# Patient Record
Sex: FEMALE | Race: WHITE | NOT HISPANIC OR LATINO | Employment: FULL TIME | URBAN - METROPOLITAN AREA
[De-identification: names, ages, dates, MRNs, and addresses within clinical notes are randomized per-mention and may not be internally consistent; named-entity substitution may affect disease eponyms.]

---

## 2017-06-08 ENCOUNTER — HISTORICAL (OUTPATIENT)
Dept: LAB | Facility: HOSPITAL | Age: 42
End: 2017-06-08

## 2017-06-08 LAB
ABS NEUT (OLG): 5.95 X10(3)/MCL (ref 2.1–9.2)
ALBUMIN SERPL-MCNC: 3.7 GM/DL (ref 3.4–5)
ALBUMIN/GLOB SERPL: 1 RATIO (ref 1.1–2)
ALP SERPL-CCNC: 98 UNIT/L (ref 38–126)
ALT SERPL-CCNC: 21 UNIT/L (ref 12–78)
AST SERPL-CCNC: 10 UNIT/L (ref 15–37)
BASOPHILS # BLD AUTO: 0 X10(3)/MCL (ref 0–0.2)
BASOPHILS NFR BLD AUTO: 0 %
BILIRUB SERPL-MCNC: 0.3 MG/DL (ref 0.2–1)
BILIRUBIN DIRECT+TOT PNL SERPL-MCNC: 0.1 MG/DL (ref 0–0.5)
BILIRUBIN DIRECT+TOT PNL SERPL-MCNC: 0.2 MG/DL (ref 0–0.8)
BUN SERPL-MCNC: 6 MG/DL (ref 7–18)
CALCIUM SERPL-MCNC: 9.8 MG/DL (ref 8.5–10.1)
CHLORIDE SERPL-SCNC: 105 MMOL/L (ref 98–107)
CHOLEST SERPL-MCNC: 230 MG/DL (ref 0–200)
CHOLEST/HDLC SERPL: 5.2 {RATIO} (ref 0–4)
CO2 SERPL-SCNC: 25 MMOL/L (ref 21–32)
CREAT SERPL-MCNC: 0.68 MG/DL (ref 0.55–1.02)
DEPRECATED CALCIDIOL+CALCIFEROL SERPL-MC: 30.3 NG/ML (ref 30–80)
EOSINOPHIL # BLD AUTO: 0.3 X10(3)/MCL (ref 0–0.9)
EOSINOPHIL NFR BLD AUTO: 3 %
ERYTHROCYTE [DISTWIDTH] IN BLOOD BY AUTOMATED COUNT: 12.2 % (ref 11.5–17)
EST. AVERAGE GLUCOSE BLD GHB EST-MCNC: 180 MG/DL
GLOBULIN SER-MCNC: 3.8 GM/DL (ref 2.4–3.5)
GLUCOSE SERPL-MCNC: 224 MG/DL (ref 74–106)
HBA1C MFR BLD: 7.9 % (ref 4.2–6.3)
HCT VFR BLD AUTO: 44.7 % (ref 37–47)
HDLC SERPL-MCNC: 44 MG/DL (ref 35–60)
HGB BLD-MCNC: 15.3 GM/DL (ref 12–16)
LDLC SERPL CALC-MCNC: 129 MG/DL (ref 0–129)
LYMPHOCYTES # BLD AUTO: 3.4 X10(3)/MCL (ref 0.6–4.6)
LYMPHOCYTES NFR BLD AUTO: 33 %
MCH RBC QN AUTO: 30 PG (ref 27–31)
MCHC RBC AUTO-ENTMCNC: 34.2 GM/DL (ref 33–36)
MCV RBC AUTO: 87.6 FL (ref 80–94)
MONOCYTES # BLD AUTO: 0.5 X10(3)/MCL (ref 0.1–1.3)
MONOCYTES NFR BLD AUTO: 5 %
NEUTROPHILS # BLD AUTO: 5.95 X10(3)/MCL (ref 2.1–9.2)
NEUTROPHILS NFR BLD AUTO: 58 %
PLATELET # BLD AUTO: 393 X10(3)/MCL (ref 130–400)
PMV BLD AUTO: 10.4 FL (ref 9.4–12.4)
POTASSIUM SERPL-SCNC: 4.6 MMOL/L (ref 3.5–5.1)
PROT SERPL-MCNC: 7.5 GM/DL (ref 6.4–8.2)
RBC # BLD AUTO: 5.1 X10(6)/MCL (ref 4.2–5.4)
SODIUM SERPL-SCNC: 141 MMOL/L (ref 136–145)
TRIGL SERPL-MCNC: 283 MG/DL (ref 30–150)
TSH SERPL-ACNC: 0.15 MIU/ML (ref 0.36–3.74)
VLDLC SERPL CALC-MCNC: 57 MG/DL
WBC # SPEC AUTO: 10.2 X10(3)/MCL (ref 4.5–11.5)

## 2017-09-25 ENCOUNTER — HISTORICAL (OUTPATIENT)
Dept: LAB | Facility: HOSPITAL | Age: 42
End: 2017-09-25

## 2017-09-25 LAB
ABS NEUT (OLG): 6.72 X10(3)/MCL (ref 2.1–9.2)
BASOPHILS # BLD AUTO: 0.1 X10(3)/MCL (ref 0–0.2)
BASOPHILS NFR BLD AUTO: 1 %
CHOLEST SERPL-MCNC: 183 MG/DL (ref 0–200)
CHOLEST/HDLC SERPL: 5.9 {RATIO} (ref 0–4)
EOSINOPHIL # BLD AUTO: 0.4 X10(3)/MCL (ref 0–0.9)
EOSINOPHIL NFR BLD AUTO: 4 %
ERYTHROCYTE [DISTWIDTH] IN BLOOD BY AUTOMATED COUNT: 12.4 % (ref 11.5–17)
EST. AVERAGE GLUCOSE BLD GHB EST-MCNC: 174 MG/DL
HBA1C MFR BLD: 7.7 % (ref 4.2–6.3)
HCT VFR BLD AUTO: 40.9 % (ref 37–47)
HDLC SERPL-MCNC: 31 MG/DL (ref 35–60)
HGB BLD-MCNC: 14.2 GM/DL (ref 12–16)
LDLC SERPL CALC-MCNC: 26 MG/DL (ref 0–129)
LYMPHOCYTES # BLD AUTO: 3.3 X10(3)/MCL (ref 0.6–4.6)
LYMPHOCYTES NFR BLD AUTO: 29 %
MCH RBC QN AUTO: 31 PG (ref 27–31)
MCHC RBC AUTO-ENTMCNC: 34.7 GM/DL (ref 33–36)
MCV RBC AUTO: 89.3 FL (ref 80–94)
MONOCYTES # BLD AUTO: 0.7 X10(3)/MCL (ref 0.1–1.3)
MONOCYTES NFR BLD AUTO: 6 %
NEUTROPHILS # BLD AUTO: 6.72 X10(3)/MCL (ref 1.4–7.9)
NEUTROPHILS NFR BLD AUTO: 60 %
PLATELET # BLD AUTO: 408 X10(3)/MCL (ref 130–400)
PMV BLD AUTO: 10.7 FL (ref 9.4–12.4)
RBC # BLD AUTO: 4.58 X10(6)/MCL (ref 4.2–5.4)
TRIGL SERPL-MCNC: 632 MG/DL (ref 30–150)
TSH SERPL-ACNC: 0.35 MIU/ML (ref 0.36–3.74)
VLDLC SERPL CALC-MCNC: 126 MG/DL
WBC # SPEC AUTO: 11.3 X10(3)/MCL (ref 4.5–11.5)

## 2017-09-27 ENCOUNTER — HISTORICAL (OUTPATIENT)
Dept: RADIOLOGY | Facility: HOSPITAL | Age: 42
End: 2017-09-27

## 2017-09-28 LAB
HUMAN PAPILLOMAVIRUS (HPV): NORMAL
PAP RECOMMENDATION EXT: NORMAL
PAP SMEAR: NORMAL

## 2017-11-30 ENCOUNTER — HISTORICAL (OUTPATIENT)
Dept: LAB | Facility: HOSPITAL | Age: 42
End: 2017-11-30

## 2017-11-30 LAB — TSH SERPL-ACNC: 0.41 MIU/ML (ref 0.36–3.74)

## 2018-03-13 ENCOUNTER — HISTORICAL (OUTPATIENT)
Dept: LAB | Facility: HOSPITAL | Age: 43
End: 2018-03-13

## 2018-03-13 LAB
ABS NEUT (OLG): 7.17 X10(3)/MCL (ref 2.1–9.2)
ALBUMIN SERPL-MCNC: 3.6 GM/DL (ref 3.4–5)
ALBUMIN/GLOB SERPL: 0.9 RATIO (ref 1.1–2)
ALP SERPL-CCNC: 102 UNIT/L (ref 38–126)
ALT SERPL-CCNC: 23 UNIT/L (ref 12–78)
AST SERPL-CCNC: 18 UNIT/L (ref 15–37)
BASOPHILS # BLD AUTO: 0 X10(3)/MCL (ref 0–0.2)
BASOPHILS NFR BLD AUTO: 0 %
BILIRUB SERPL-MCNC: 0.3 MG/DL (ref 0.2–1)
BILIRUBIN DIRECT+TOT PNL SERPL-MCNC: 0.1 MG/DL (ref 0–0.5)
BILIRUBIN DIRECT+TOT PNL SERPL-MCNC: 0.2 MG/DL (ref 0–0.8)
BUN SERPL-MCNC: 7 MG/DL (ref 7–18)
CALCIUM SERPL-MCNC: 9.2 MG/DL (ref 8.5–10.1)
CHLORIDE SERPL-SCNC: 102 MMOL/L (ref 98–107)
CHOLEST SERPL-MCNC: 170 MG/DL (ref 0–200)
CHOLEST/HDLC SERPL: 3.5 {RATIO} (ref 0–4)
CO2 SERPL-SCNC: 24 MMOL/L (ref 21–32)
CREAT SERPL-MCNC: 0.76 MG/DL (ref 0.55–1.02)
EOSINOPHIL # BLD AUTO: 0.3 X10(3)/MCL (ref 0–0.9)
EOSINOPHIL NFR BLD AUTO: 3 %
ERYTHROCYTE [DISTWIDTH] IN BLOOD BY AUTOMATED COUNT: 12.2 % (ref 11.5–17)
EST. AVERAGE GLUCOSE BLD GHB EST-MCNC: 177 MG/DL
GLOBULIN SER-MCNC: 3.8 GM/DL (ref 2.4–3.5)
GLUCOSE SERPL-MCNC: 220 MG/DL (ref 74–106)
HBA1C MFR BLD: 7.8 % (ref 4.2–6.3)
HCT VFR BLD AUTO: 41.1 % (ref 37–47)
HDLC SERPL-MCNC: 48 MG/DL (ref 35–60)
HGB BLD-MCNC: 13.5 GM/DL (ref 12–16)
LDLC SERPL CALC-MCNC: 73 MG/DL (ref 0–129)
LYMPHOCYTES # BLD AUTO: 2.7 X10(3)/MCL (ref 0.6–4.6)
LYMPHOCYTES NFR BLD AUTO: 25 %
MCH RBC QN AUTO: 28.7 PG (ref 27–31)
MCHC RBC AUTO-ENTMCNC: 32.8 GM/DL (ref 33–36)
MCV RBC AUTO: 87.4 FL (ref 80–94)
MONOCYTES # BLD AUTO: 0.6 X10(3)/MCL (ref 0.1–1.3)
MONOCYTES NFR BLD AUTO: 6 %
NEUTROPHILS # BLD AUTO: 7.17 X10(3)/MCL (ref 1.4–7.9)
NEUTROPHILS NFR BLD AUTO: 65 %
PLATELET # BLD AUTO: 332 X10(3)/MCL (ref 130–400)
PMV BLD AUTO: 10.1 FL (ref 9.4–12.4)
POTASSIUM SERPL-SCNC: 4.6 MMOL/L (ref 3.5–5.1)
PROT SERPL-MCNC: 7.4 GM/DL (ref 6.4–8.2)
RBC # BLD AUTO: 4.7 X10(6)/MCL (ref 4.2–5.4)
SODIUM SERPL-SCNC: 135 MMOL/L (ref 136–145)
TRIGL SERPL-MCNC: 244 MG/DL (ref 30–150)
TSH SERPL-ACNC: 0.24 MIU/ML (ref 0.36–3.74)
VLDLC SERPL CALC-MCNC: 49 MG/DL
WBC # SPEC AUTO: 10.9 X10(3)/MCL (ref 4.5–11.5)

## 2018-06-06 ENCOUNTER — HISTORICAL (OUTPATIENT)
Dept: LAB | Facility: HOSPITAL | Age: 43
End: 2018-06-06

## 2018-06-06 LAB
ABS NEUT (OLG): 5.02 X10(3)/MCL (ref 2.1–9.2)
ALBUMIN SERPL-MCNC: 3.1 GM/DL (ref 3.4–5)
ALBUMIN/GLOB SERPL: 0.9 {RATIO}
ALP SERPL-CCNC: 96 UNIT/L (ref 38–126)
ALT SERPL-CCNC: 22 UNIT/L (ref 12–78)
AST SERPL-CCNC: 14 UNIT/L (ref 15–37)
BASOPHILS # BLD AUTO: 0.1 X10(3)/MCL (ref 0–0.2)
BASOPHILS NFR BLD AUTO: 1 %
BILIRUB SERPL-MCNC: 0.2 MG/DL (ref 0.2–1)
BILIRUBIN DIRECT+TOT PNL SERPL-MCNC: 0 MG/DL (ref 0–0.5)
BILIRUBIN DIRECT+TOT PNL SERPL-MCNC: 0.2 MG/DL (ref 0–0.8)
BUN SERPL-MCNC: 8 MG/DL (ref 7–18)
CALCIUM SERPL-MCNC: 8.3 MG/DL (ref 8.5–10.1)
CHLORIDE SERPL-SCNC: 107 MMOL/L (ref 98–107)
CHOLEST SERPL-MCNC: 161 MG/DL (ref 0–200)
CHOLEST/HDLC SERPL: 4.5 {RATIO} (ref 0–4)
CO2 SERPL-SCNC: 25 MMOL/L (ref 21–32)
CREAT SERPL-MCNC: 0.69 MG/DL (ref 0.55–1.02)
EOSINOPHIL # BLD AUTO: 0.2 X10(3)/MCL (ref 0–0.9)
EOSINOPHIL NFR BLD AUTO: 3 %
ERYTHROCYTE [DISTWIDTH] IN BLOOD BY AUTOMATED COUNT: 12.6 % (ref 11.5–17)
EST. AVERAGE GLUCOSE BLD GHB EST-MCNC: 166 MG/DL
GLOBULIN SER-MCNC: 3.4 GM/DL (ref 2.4–3.5)
GLUCOSE SERPL-MCNC: 192 MG/DL (ref 74–106)
HBA1C MFR BLD: 7.4 % (ref 4.2–6.3)
HCT VFR BLD AUTO: 40.7 % (ref 37–47)
HDLC SERPL-MCNC: 36 MG/DL (ref 35–60)
HGB BLD-MCNC: 12.9 GM/DL (ref 12–16)
LDLC SERPL CALC-MCNC: 77 MG/DL (ref 0–129)
LYMPHOCYTES # BLD AUTO: 2.5 X10(3)/MCL (ref 0.6–4.6)
LYMPHOCYTES NFR BLD AUTO: 30 %
MCH RBC QN AUTO: 28.4 PG (ref 27–31)
MCHC RBC AUTO-ENTMCNC: 31.7 GM/DL (ref 33–36)
MCV RBC AUTO: 89.5 FL (ref 80–94)
MONOCYTES # BLD AUTO: 0.5 X10(3)/MCL (ref 0.1–1.3)
MONOCYTES NFR BLD AUTO: 6 %
NEUTROPHILS # BLD AUTO: 5.02 X10(3)/MCL (ref 2.1–9.2)
NEUTROPHILS NFR BLD AUTO: 60 %
PLATELET # BLD AUTO: 362 X10(3)/MCL (ref 130–400)
PMV BLD AUTO: 9.5 FL (ref 9.4–12.4)
POTASSIUM SERPL-SCNC: 4.5 MMOL/L (ref 3.5–5.1)
PROT SERPL-MCNC: 6.5 GM/DL (ref 6.4–8.2)
RBC # BLD AUTO: 4.55 X10(6)/MCL (ref 4.2–5.4)
SODIUM SERPL-SCNC: 140 MMOL/L (ref 136–145)
T3FREE SERPL-MCNC: 2.39 PG/ML (ref 2.18–3.98)
T4 FREE SERPL-MCNC: 0.81 NG/DL (ref 0.76–1.46)
TRIGL SERPL-MCNC: 238 MG/DL (ref 30–150)
TSH SERPL-ACNC: 0.18 MIU/L (ref 0.36–3.74)
VLDLC SERPL CALC-MCNC: 48 MG/DL
WBC # SPEC AUTO: 8.3 X10(3)/MCL (ref 4.5–11.5)

## 2019-02-08 LAB
INFLUENZA A ANTIGEN, POC: NEGATIVE
INFLUENZA B ANTIGEN, POC: NEGATIVE

## 2019-03-14 ENCOUNTER — HISTORICAL (OUTPATIENT)
Dept: RADIOLOGY | Facility: HOSPITAL | Age: 44
End: 2019-03-14

## 2019-06-28 ENCOUNTER — HISTORICAL (OUTPATIENT)
Dept: LAB | Facility: HOSPITAL | Age: 44
End: 2019-06-28

## 2019-06-28 LAB
AMPHET UR QL SCN: ABNORMAL
BARBITURATE SCN PRESENT UR: ABNORMAL
BENZODIAZ UR QL SCN: ABNORMAL
CANNABINOIDS UR QL SCN: ABNORMAL
COCAINE UR QL SCN: ABNORMAL
OPIATES UR QL SCN: ABNORMAL
PCP UR QL: ABNORMAL
PH UR STRIP.AUTO: 5.5 [PH] (ref 5–7.5)
SP GR FLD REFRACTOMETRY: 1.04 (ref 1–1.03)

## 2022-04-10 ENCOUNTER — HISTORICAL (OUTPATIENT)
Dept: ADMINISTRATIVE | Facility: HOSPITAL | Age: 47
End: 2022-04-10

## 2022-04-25 VITALS
DIASTOLIC BLOOD PRESSURE: 81 MMHG | BODY MASS INDEX: 37.83 KG/M2 | WEIGHT: 227.06 LBS | HEIGHT: 65 IN | OXYGEN SATURATION: 99 % | SYSTOLIC BLOOD PRESSURE: 122 MMHG

## 2022-05-23 RX ORDER — DEXTROAMPHETAMINE SACCHARATE, AMPHETAMINE ASPARTATE, DEXTROAMPHETAMINE SULFATE AND AMPHETAMINE SULFATE 5; 5; 5; 5 MG/1; MG/1; MG/1; MG/1
TABLET ORAL
Qty: 45 TABLET | Refills: 0 | Status: SHIPPED | OUTPATIENT
Start: 2022-05-23 | End: 2022-06-23

## 2022-05-23 RX ORDER — DEXTROAMPHETAMINE SACCHARATE, AMPHETAMINE ASPARTATE, DEXTROAMPHETAMINE SULFATE AND AMPHETAMINE SULFATE 7.5; 7.5; 7.5; 7.5 MG/1; MG/1; MG/1; MG/1
TABLET ORAL
Qty: 30 TABLET | Refills: 0 | Status: SHIPPED | OUTPATIENT
Start: 2022-05-23 | End: 2022-06-23

## 2022-05-23 NOTE — TELEPHONE ENCOUNTER
I have signed for the following meds.  Please call the patient to inform about any medications that were sent.      SHANTEMEAGHAN REVIEWED. PLEASE MAKE SURE SHE HAS APPOINTMENT SCHEDULED FOR 6/2022. LOV 3/24/22 AND NEEDS TO BE SEEN Q3 MONTHS WHILE ON THIS MEDICATION.    Medications Ordered This Encounter   Medications    dextroamphetamine-amphetamine (ADDERALL) 20 mg tablet     Sig: TAKE ONE TABLET BY MOUTH AT NOON AND 1/2 TABLET AT BEDTIME     Dispense:  45 tablet     Refill:  0    dextroamphetamine-amphetamine 30 mg Tab     Sig: TAKE ONE TABLET BY MOUTH EVERY MORNING X 30 DAYS     Dispense:  30 tablet     Refill:  0

## 2022-06-23 ENCOUNTER — TELEPHONE (OUTPATIENT)
Dept: FAMILY MEDICINE | Facility: CLINIC | Age: 47
End: 2022-06-23

## 2022-07-26 NOTE — TELEPHONE ENCOUNTER
----- Message from Dennis Wilkerson sent at 7/26/2022 12:20 PM CDT -----  .Type:  Needs Medical Advice    Who Called: Katharine  Symptoms (please be specific):    How long has patient had these symptoms:    Pharmacy name and phone #:    Would the patient rather a call back or a response via [facility]?   Best Call Back Number: [phone]  Additional Information: She was calling back to speak with nurse, just needs to see if she can get her medication refills up to her apt date. She left message this morning with details.

## 2022-07-26 NOTE — TELEPHONE ENCOUNTER
Patient tested positive for covid on the day of her previously scheduled apt. Our next available was 8/3/22 so she is requesting either a refill or if she can get enough medication to last until her apt on 8/3/22. Please advise. Thank you

## 2022-07-26 NOTE — TELEPHONE ENCOUNTER
Patient has apt 8/3/22    ----- Message from Suri Hernandez sent at 7/26/2022  8:53 AM CDT -----  Regarding: refill  Type:  RX Refill Request    Who Called: pt  Refill or New Rx:refill  RX Name and Strength:adderol 30 mgs    How is the patient currently taking it? (ex. 1XDay):1 day am    RX Name and Strength:adderol 20 mgs    How is the patient currently taking it? (ex. 1XDay): 1/2 tab in am & 1 tab pm    Is this a 30 day or 90 day RX:30  Preferred Pharmacy with phone number[facility] [place]  Local or Mail Order:local  Ordering Provider:ita zuleta  Would the patient rather a call back or a response via [facility]? C/b  Best Call Back Number:[phone]  Additional Information: cancl last appt due to covid positive has new appt 8/3

## 2022-07-27 RX ORDER — DEXTROAMPHETAMINE SACCHARATE, AMPHETAMINE ASPARTATE, DEXTROAMPHETAMINE SULFATE AND AMPHETAMINE SULFATE 7.5; 7.5; 7.5; 7.5 MG/1; MG/1; MG/1; MG/1
1 TABLET ORAL EVERY MORNING
Qty: 30 TABLET | Refills: 0 | Status: SHIPPED | OUTPATIENT
Start: 2022-07-27 | End: 2022-08-03 | Stop reason: SDUPTHER

## 2022-07-27 RX ORDER — DEXTROAMPHETAMINE SACCHARATE, AMPHETAMINE ASPARTATE, DEXTROAMPHETAMINE SULFATE AND AMPHETAMINE SULFATE 5; 5; 5; 5 MG/1; MG/1; MG/1; MG/1
TABLET ORAL
Qty: 45 TABLET | Refills: 0 | Status: SHIPPED | OUTPATIENT
Start: 2022-07-27 | End: 2022-08-03 | Stop reason: SDUPTHER

## 2022-07-27 NOTE — TELEPHONE ENCOUNTER
I have signed for the following meds.  Please call the patient to inform about any medications that were sent.      [facility]xcare reviewed. Keep next scheduled appointment for further refills.     Medications Ordered This Encounter   Medications    dextroamphetamine-amphetamine (ADDERALL) 20 mg tablet     Sig: TAKE ONE TABLET BY MOUTH AT NOON AND 1/2 TABLET AT 3PM     Dispense:  45 tablet     Refill:  0    dextroamphetamine-amphetamine 30 mg Tab     Sig: Take 1 tablet (30 mg total) by mouth every morning.     Dispense:  30 tablet     Refill:  0

## 2022-08-02 PROBLEM — F31.9 BIPOLAR DISORDER: Status: ACTIVE | Noted: 2022-08-02

## 2022-08-02 PROBLEM — Z28.21 TETANUS, DIPHTHERIA, AND ACELLULAR PERTUSSIS (TDAP) VACCINATION DECLINED: Status: ACTIVE | Noted: 2022-08-02

## 2022-08-02 PROBLEM — E11.9 DIABETES MELLITUS: Status: ACTIVE | Noted: 2022-08-02

## 2022-08-02 PROBLEM — E03.9 HYPOTHYROIDISM: Status: ACTIVE | Noted: 2022-08-02

## 2022-08-02 PROBLEM — E66.9 OBESITY: Status: ACTIVE | Noted: 2022-08-02

## 2022-08-02 PROBLEM — E78.5 HYPERLIPIDEMIA: Status: ACTIVE | Noted: 2022-08-02

## 2022-08-02 PROBLEM — F90.9 ATTENTION DEFICIT HYPERACTIVITY DISORDER (ADHD): Status: ACTIVE | Noted: 2022-08-02

## 2022-08-02 NOTE — PROGRESS NOTES
Patient ID: Katharine Scott is a 47 y.o. female.    Chief Complaint: Follow-up (3 month f/u)       is self pay. got job at [employer]. still has not gone to get labs. waiting on time and money to do. states will do soon.    Had covid and had to reschedule last appt. Overall doing well    She has a history of ADHD and is on Adderall 40 mg (30mg + 10mg) in the 6 morning and Adderall 10 mg at 2pm. needs postdated refills of this.    The patient also has a history of diabetes and thyroid disease which was followed by Dr. Rodriguez. has not seen him since lost insurance. was on radioactive iodine. on synthroid 175mcg daily. tsh 6/2021 wnl.    She has a history of diabetes--- states she talked to erich for her assistance program to extend her program for trulicity. when she is on trulicity- cbgs 127 average. she is prescribed trulicity 1.5mg weekly and glipizide 10mg (20mg BID). She went to [facility] for her eye exam on 2/2020- a copy is in the chart. we will defer in office eye exam today as unsure of price for self pay patient and she would like to avoid a bill if possible. a1c 6.3 3/2021    She has had a history of hyperlipidemia and is compliant with her medications. On pravastatin 40mg and fenofibrate    She has a history of bipolar/depression and saw psychiatry, Dr. Holly Camarena in the past. on wellbutrin and oxcarbazepine and effexor in past.  Currently only on trileptal.  Reports a lot of anxiety lately.   health is down.  Kids are doing ok. Daughter is on lexapro and she would like to try this.  Also asking for something she can take as needed.     She is ALLERGIC to aspirin and metformin. She does not drink alcohol or smoke. Got 2020 flu shot. declines tdap and Shingrix. completed covid series and booster.    Review of Systems   Constitutional: Negative.    HENT: Negative.    Eyes: Negative.    Respiratory: Negative.    Cardiovascular: Negative.    Gastrointestinal: Negative.   "  Endocrine: Negative.    Genitourinary: Negative.    Musculoskeletal: Negative.    Allergic/Immunologic: Negative.    Neurological: Negative.    Hematological: Negative.    Psychiatric/Behavioral: Positive for decreased concentration. The patient is nervous/anxious.          Review of patient's allergies indicates:   Allergen Reactions    Aspirin     Metformin      Other reaction(s): makes ill    Morphine     Penicillin Rash      Vitals:    08/03/22 1102   BP: 130/78   BP Location: Left arm   Patient Position: Sitting   BP Method: Large (Manual)   Pulse: 83   Resp: 18   Temp: 99.2 °F (37.3 °C)   SpO2: 98%   Weight: 101 kg (222 lb 11.2 oz)   Height: 5' 5" (1.651 m)      Social History     Socioeconomic History    Marital status:    Occupational History    Occupation: pharmacy   Tobacco Use    Smoking status: Never Smoker    Smokeless tobacco: Never Used   Substance and Sexual Activity    Alcohol use: Not Currently    Drug use: Never      History reviewed. No pertinent family history.       Objective:     Physical Exam  Current Outpatient Medications on File Prior to Visit   Medication Sig Dispense Refill    azelastine-fluticasone (DYMISTA) 137-50 mcg/spray Spry nassal spray Dymista 137 mcg-50 mcg/spray nasal spray   1 spray to each nostril twice a day.      fenofibrate (TRICOR) 145 MG tablet Take 48 mg by mouth once daily. Take 1 tablet po once daily      levothyroxine (SYNTHROID, LEVOTHROID) 175 MCG tablet Take 175 mcg by mouth once daily.      OXcarbazepine (TRILEPTAL) 600 MG Tab Take 600 mg by mouth once daily.      pravastatin (PRAVACHOL) 80 MG tablet Take 80 mg by mouth once daily.      traZODone (DESYREL) 100 MG tablet Take 100 mg by mouth once daily.      [DISCONTINUED] dextroamphetamine-amphetamine (ADDERALL) 20 mg tablet TAKE ONE TABLET BY MOUTH AT NOON AND 1/2 TABLET AT 3PM 45 tablet 0    [DISCONTINUED] dextroamphetamine-amphetamine 30 mg Tab Take 1 tablet (30 mg total) by mouth " every morning. 30 tablet 0     No current facility-administered medications on file prior to visit.     Health Maintenance   Topic Date Due    Hepatitis C Screening  Never done    TETANUS VACCINE  Never done    Mammogram  09/27/2018    Lipid Panel  06/06/2023      Results for orders placed or performed in visit on 06/28/19   Drug Screen, Urine   Result Value Ref Range    Specific Gravity Urine Automated 1.044 (H) 1.001 - 1.035    pH, UA 5.5 5.0 - 7.5    Cannabinoids, Urine NEG     Barbituates, Urine NEG     Cocaine, Urine NEG     Benzodiazepine, Urine NEG     Amphetamines, Urine POS     Opiates, Urine NEG     Phencyclidine, Urine NEG           Assessment & Plan:     Active Problem List with Overview Notes    Diagnosis Date Noted    Attention deficit hyperactivity disorder (ADHD) 08/02/2022    Bipolar disorder 08/02/2022    Diabetes mellitus 08/02/2022    Hyperlipidemia 08/02/2022    Hypothyroidism 08/02/2022    Obesity 08/02/2022    Tetanus, diphtheria, and acellular pertussis (Tdap) vaccination declined 08/02/2022       1. Attention deficit hyperactivity disorder (ADHD), unspecified ADHD type  Assessment & Plan:  [facility] reviewed. Will plan to update controlled substance policy at next in office appt. Refill sent in as requested. Reminded patient this is a controlled medication and must be seen q3 months while on.  Patient is agreeable to plan and verbalized understanding        2. Bipolar affective disorder, remission status unspecified  Assessment & Plan:  Reporting more anxiety recently.  Will add lexapro and vistaril prn.  Will contact clinic for concerns. Patient is agreeable to plan and verbalized understanding      3. Hyperlipidemia, unspecified hyperlipidemia type  Assessment & Plan:  On statin and fenofibrate      4. Hypothyroidism, unspecified type  Assessment & Plan:  Stable on synthroid      5. Class 2 obesity with body mass index (BMI) of 37.0 to 37.9 in adult, unspecified obesity type,  unspecified whether serious comorbidity present  Assessment & Plan:  Encourage lifestyle change      6. Anxiety  -     EScitalopram oxalate (LEXAPRO) 10 MG tablet  -     hydrOXYzine pamoate (VISTARIL) 25 MG Cap    Other orders  -     dextroamphetamine-amphetamine 30 mg Tab  -     dextroamphetamine-amphetamine (ADDERALL) 20 mg tablet  -     dextroamphetamine-amphetamine 30 mg Tab  -     dextroamphetamine-amphetamine 30 mg Tab  -     dextroamphetamine-amphetamine (ADDERALL) 20 mg tablet  -     dextroamphetamine-amphetamine 30 mg Tab  -     dextroamphetamine-amphetamine (ADDERALL) 20 mg tablet       Follow up in about 3 months (around 11/3/2022) for chronic conditions.

## 2022-08-03 ENCOUNTER — OFFICE VISIT (OUTPATIENT)
Dept: FAMILY MEDICINE | Facility: CLINIC | Age: 47
End: 2022-08-03

## 2022-08-03 VITALS
HEART RATE: 83 BPM | OXYGEN SATURATION: 98 % | HEIGHT: 65 IN | BODY MASS INDEX: 37.1 KG/M2 | DIASTOLIC BLOOD PRESSURE: 78 MMHG | RESPIRATION RATE: 18 BRPM | TEMPERATURE: 99 F | SYSTOLIC BLOOD PRESSURE: 130 MMHG | WEIGHT: 222.69 LBS

## 2022-08-03 DIAGNOSIS — E66.9 CLASS 2 OBESITY WITH BODY MASS INDEX (BMI) OF 37.0 TO 37.9 IN ADULT, UNSPECIFIED OBESITY TYPE, UNSPECIFIED WHETHER SERIOUS COMORBIDITY PRESENT: ICD-10-CM

## 2022-08-03 DIAGNOSIS — E03.9 HYPOTHYROIDISM, UNSPECIFIED TYPE: ICD-10-CM

## 2022-08-03 DIAGNOSIS — F90.9 ATTENTION DEFICIT HYPERACTIVITY DISORDER (ADHD), UNSPECIFIED ADHD TYPE: Primary | ICD-10-CM

## 2022-08-03 DIAGNOSIS — F31.9 BIPOLAR AFFECTIVE DISORDER, REMISSION STATUS UNSPECIFIED: ICD-10-CM

## 2022-08-03 DIAGNOSIS — F41.9 ANXIETY: ICD-10-CM

## 2022-08-03 DIAGNOSIS — E78.5 HYPERLIPIDEMIA, UNSPECIFIED HYPERLIPIDEMIA TYPE: ICD-10-CM

## 2022-08-03 PROBLEM — B07.9 WART: Status: ACTIVE | Noted: 2022-08-03

## 2022-08-03 PROBLEM — B07.9 WART: Status: RESOLVED | Noted: 2022-08-03 | Resolved: 2022-08-03

## 2022-08-03 PROCEDURE — 99212 OFFICE O/P EST SF 10 MIN: CPT | Mod: ,,, | Performed by: FAMILY MEDICINE

## 2022-08-03 PROCEDURE — 99212 PR OFFICE/OUTPT VISIT, EST, LEVL II, 10-19 MIN: ICD-10-PCS | Mod: ,,, | Performed by: FAMILY MEDICINE

## 2022-08-03 RX ORDER — DEXTROAMPHETAMINE SACCHARATE, AMPHETAMINE ASPARTATE, DEXTROAMPHETAMINE SULFATE AND AMPHETAMINE SULFATE 7.5; 7.5; 7.5; 7.5 MG/1; MG/1; MG/1; MG/1
1 TABLET ORAL EVERY MORNING
Qty: 30 TABLET | Refills: 0 | Status: SHIPPED | OUTPATIENT
Start: 2022-10-25 | End: 2022-11-24

## 2022-08-03 RX ORDER — TRAZODONE HYDROCHLORIDE 100 MG/1
100 TABLET ORAL DAILY
COMMUNITY
Start: 2021-12-28 | End: 2022-08-29

## 2022-08-03 RX ORDER — DEXTROAMPHETAMINE SACCHARATE, AMPHETAMINE ASPARTATE, DEXTROAMPHETAMINE SULFATE AND AMPHETAMINE SULFATE 7.5; 7.5; 7.5; 7.5 MG/1; MG/1; MG/1; MG/1
1 TABLET ORAL EVERY MORNING
Qty: 30 TABLET | Refills: 0 | Status: SHIPPED | OUTPATIENT
Start: 2022-08-26 | End: 2022-09-25

## 2022-08-03 RX ORDER — DEXTROAMPHETAMINE SACCHARATE, AMPHETAMINE ASPARTATE, DEXTROAMPHETAMINE SULFATE AND AMPHETAMINE SULFATE 5; 5; 5; 5 MG/1; MG/1; MG/1; MG/1
TABLET ORAL
Qty: 45 TABLET | Refills: 0 | Status: SHIPPED | OUTPATIENT
Start: 2022-09-25 | End: 2022-11-29 | Stop reason: ALTCHOICE

## 2022-08-03 RX ORDER — AZELASTINE HYDROCHLORIDE, FLUTICASONE PROPIONATE 137; 50 UG/1; UG/1
SPRAY, METERED NASAL
COMMUNITY
End: 2023-02-22 | Stop reason: SDUPTHER

## 2022-08-03 RX ORDER — HYDROXYZINE PAMOATE 25 MG/1
25 CAPSULE ORAL EVERY 4 HOURS PRN
Qty: 40 CAPSULE | Refills: 3 | Status: SHIPPED | OUTPATIENT
Start: 2022-08-03 | End: 2023-02-22

## 2022-08-03 RX ORDER — DEXTROAMPHETAMINE SACCHARATE, AMPHETAMINE ASPARTATE, DEXTROAMPHETAMINE SULFATE AND AMPHETAMINE SULFATE 5; 5; 5; 5 MG/1; MG/1; MG/1; MG/1
TABLET ORAL
Qty: 45 TABLET | Refills: 0 | Status: SHIPPED | OUTPATIENT
Start: 2022-10-25 | End: 2022-11-29 | Stop reason: ALTCHOICE

## 2022-08-03 RX ORDER — ESCITALOPRAM OXALATE 10 MG/1
10 TABLET ORAL DAILY
Qty: 30 TABLET | Refills: 11 | Status: SHIPPED | OUTPATIENT
Start: 2022-08-03 | End: 2023-02-22

## 2022-08-03 RX ORDER — DEXTROAMPHETAMINE SACCHARATE, AMPHETAMINE ASPARTATE, DEXTROAMPHETAMINE SULFATE AND AMPHETAMINE SULFATE 7.5; 7.5; 7.5; 7.5 MG/1; MG/1; MG/1; MG/1
1 TABLET ORAL EVERY MORNING
Qty: 30 TABLET | Refills: 0 | Status: SHIPPED | OUTPATIENT
Start: 2022-09-25 | End: 2022-10-25

## 2022-08-03 RX ORDER — FENOFIBRATE 145 MG/1
48 TABLET, FILM COATED ORAL DAILY
COMMUNITY
End: 2023-02-22

## 2022-08-03 RX ORDER — LEVOTHYROXINE SODIUM 175 UG/1
175 TABLET ORAL DAILY
COMMUNITY
Start: 2021-09-30 | End: 2022-10-21

## 2022-08-03 RX ORDER — OXCARBAZEPINE 600 MG/1
600 TABLET, FILM COATED ORAL DAILY
COMMUNITY
Start: 2022-01-11 | End: 2023-03-16

## 2022-08-03 RX ORDER — DEXTROAMPHETAMINE SACCHARATE, AMPHETAMINE ASPARTATE, DEXTROAMPHETAMINE SULFATE AND AMPHETAMINE SULFATE 5; 5; 5; 5 MG/1; MG/1; MG/1; MG/1
TABLET ORAL
Qty: 45 TABLET | Refills: 0 | Status: SHIPPED | OUTPATIENT
Start: 2022-08-26 | End: 2022-11-29 | Stop reason: SDUPTHER

## 2022-08-03 RX ORDER — PRAVASTATIN SODIUM 80 MG/1
80 TABLET ORAL DAILY
COMMUNITY
Start: 2021-06-08 | End: 2023-02-22

## 2022-08-03 NOTE — ASSESSMENT & PLAN NOTE
Garett reviewed. Will plan to update controlled substance policy at next in office appt. Refill sent in as requested. Reminded patient this is a controlled medication and must be seen q3 months while on.  Patient is agreeable to plan and verbalized understanding

## 2022-08-03 NOTE — ASSESSMENT & PLAN NOTE
Reporting more anxiety recently.  Will add lexapro and vistaril prn.  Will contact clinic for concerns. Patient is agreeable to plan and verbalized understanding

## 2022-09-21 ENCOUNTER — HISTORICAL (OUTPATIENT)
Dept: ADMINISTRATIVE | Facility: HOSPITAL | Age: 47
End: 2022-09-21

## 2022-11-29 ENCOUNTER — TELEPHONE (OUTPATIENT)
Dept: FAMILY MEDICINE | Facility: CLINIC | Age: 47
End: 2022-11-29

## 2022-11-29 ENCOUNTER — OFFICE VISIT (OUTPATIENT)
Dept: FAMILY MEDICINE | Facility: CLINIC | Age: 47
End: 2022-11-29

## 2022-11-29 VITALS
TEMPERATURE: 99 F | RESPIRATION RATE: 18 BRPM | OXYGEN SATURATION: 98 % | BODY MASS INDEX: 37.06 KG/M2 | SYSTOLIC BLOOD PRESSURE: 139 MMHG | DIASTOLIC BLOOD PRESSURE: 89 MMHG | HEIGHT: 65 IN | HEART RATE: 89 BPM

## 2022-11-29 DIAGNOSIS — F90.9 ATTENTION DEFICIT HYPERACTIVITY DISORDER (ADHD), UNSPECIFIED ADHD TYPE: Primary | ICD-10-CM

## 2022-11-29 PROCEDURE — 99203 PR OFFICE/OUTPT VISIT, NEW, LEVL III, 30-44 MIN: ICD-10-PCS | Mod: ,,, | Performed by: NURSE PRACTITIONER

## 2022-11-29 PROCEDURE — 99203 OFFICE O/P NEW LOW 30 MIN: CPT | Mod: ,,, | Performed by: NURSE PRACTITIONER

## 2022-11-29 RX ORDER — DEXTROAMPHETAMINE SACCHARATE, AMPHETAMINE ASPARTATE, DEXTROAMPHETAMINE SULFATE AND AMPHETAMINE SULFATE 5; 5; 5; 5 MG/1; MG/1; MG/1; MG/1
TABLET ORAL
Qty: 45 TABLET | Refills: 0 | Status: SHIPPED | OUTPATIENT
Start: 2022-11-29 | End: 2023-05-22

## 2022-11-29 RX ORDER — DEXTROAMPHETAMINE SACCHARATE, AMPHETAMINE ASPARTATE, DEXTROAMPHETAMINE SULFATE AND AMPHETAMINE SULFATE 7.5; 7.5; 7.5; 7.5 MG/1; MG/1; MG/1; MG/1
30 TABLET ORAL EVERY MORNING
Qty: 30 TABLET | Refills: 0 | Status: SHIPPED | OUTPATIENT
Start: 2022-11-29 | End: 2023-05-22

## 2022-11-29 RX ORDER — DEXTROAMPHETAMINE SACCHARATE, AMPHETAMINE ASPARTATE, DEXTROAMPHETAMINE SULFATE AND AMPHETAMINE SULFATE 7.5; 7.5; 7.5; 7.5 MG/1; MG/1; MG/1; MG/1
30 TABLET ORAL EVERY MORNING
Qty: 30 TABLET | Refills: 0 | Status: SHIPPED | OUTPATIENT
Start: 2023-01-27 | End: 2023-02-22 | Stop reason: SDUPTHER

## 2022-11-29 RX ORDER — DEXTROAMPHETAMINE SACCHARATE, AMPHETAMINE ASPARTATE, DEXTROAMPHETAMINE SULFATE AND AMPHETAMINE SULFATE 5; 5; 5; 5 MG/1; MG/1; MG/1; MG/1
TABLET ORAL
Qty: 45 TABLET | Refills: 0 | Status: SHIPPED | OUTPATIENT
Start: 2023-01-27 | End: 2023-02-22 | Stop reason: SDUPTHER

## 2022-11-29 RX ORDER — DEXTROAMPHETAMINE SACCHARATE, AMPHETAMINE ASPARTATE, DEXTROAMPHETAMINE SULFATE AND AMPHETAMINE SULFATE 7.5; 7.5; 7.5; 7.5 MG/1; MG/1; MG/1; MG/1
30 TABLET ORAL EVERY MORNING
Qty: 30 TABLET | Refills: 0 | Status: SHIPPED | OUTPATIENT
Start: 2022-12-29 | End: 2023-05-22

## 2022-11-29 RX ORDER — DEXTROAMPHETAMINE SACCHARATE, AMPHETAMINE ASPARTATE, DEXTROAMPHETAMINE SULFATE AND AMPHETAMINE SULFATE 5; 5; 5; 5 MG/1; MG/1; MG/1; MG/1
TABLET ORAL
Qty: 45 TABLET | Refills: 0 | Status: SHIPPED | OUTPATIENT
Start: 2022-12-29 | End: 2023-05-22

## 2022-11-29 NOTE — PROGRESS NOTES
Subjective:      Patient ID: Katharine Scott is a 47 y.o. White female      Chief Complaint: Follow-up (3 Month Follow-up )       History reviewed. No pertinent past medical history.     HPI  Present to clinic for follow up ADHD.    ADHD:  Hx of ADHD and is currently taking Adderall 30 mg at 6 a.m.,  Adderall 20 mg po at 9 a.m., and Adderall 20 mg (1/2 tablet=10 mg) at 2pm. States improved symptoms with current dose.  Denies any ADR.  Needs refills.      Pt is a self pay and is interested in case management referral in order to assist with selecting health insurance.      Review of Systems   Constitutional: Negative.  Negative for appetite change, chills and fever.   HENT: Negative.     Eyes: Negative.  Negative for discharge and redness.   Respiratory: Negative.  Negative for shortness of breath.    Cardiovascular: Negative.  Negative for chest pain.   Gastrointestinal: Negative.    Endocrine: Negative.    Genitourinary: Negative.    Integumentary:  Negative.   Allergic/Immunologic: Negative.    Neurological: Negative.    Psychiatric/Behavioral: Negative.     All other systems reviewed and are negative.       Objective:      Vitals:    11/29/22 1437   BP: 139/89   Pulse: 89   Resp: 18   Temp: 99 °F (37.2 °C)      Body mass index is 37.06 kg/m².     Physical Exam  Vitals reviewed.   Constitutional:       Appearance: Normal appearance.   HENT:      Head: Normocephalic.      Mouth/Throat:      Mouth: Mucous membranes are moist.   Eyes:      Conjunctiva/sclera: Conjunctivae normal.      Pupils: Pupils are equal, round, and reactive to light.   Cardiovascular:      Rate and Rhythm: Normal rate and regular rhythm.   Pulmonary:      Effort: Pulmonary effort is normal. No respiratory distress.      Breath sounds: Normal breath sounds.   Musculoskeletal:         General: Normal range of motion.      Cervical back: Normal range of motion and neck supple.   Skin:     General: Skin is warm and dry.   Neurological:       Mental Status: She is alert and oriented to person, place, and time.   Psychiatric:         Mood and Affect: Mood normal.          Current Outpatient Medications:     azelastine-fluticasone (DYMISTA) 137-50 mcg/spray Spry nassal spray, Dymista 137 mcg-50 mcg/spray nasal spray  1 spray to each nostril twice a day., Disp: , Rfl:     EScitalopram oxalate (LEXAPRO) 10 MG tablet, Take 1 tablet (10 mg total) by mouth once daily., Disp: 30 tablet, Rfl: 11    fenofibrate (TRICOR) 145 MG tablet, Take 48 mg by mouth once daily. Take 1 tablet po once daily, Disp: , Rfl:     hydrOXYzine pamoate (VISTARIL) 25 MG Cap, Take 1 capsule (25 mg total) by mouth every 4 (four) hours as needed (anxiety)., Disp: 40 capsule, Rfl: 3    levothyroxine (SYNTHROID, LEVOTHROID) 175 MCG tablet, TAKE ONE TABLET BY MOUTH DAILY, Disp: 30 tablet, Rfl: 5    omeprazole (PRILOSEC) 40 MG capsule, TAKE ONE CAPSULE BY MOUTH EVERY DAY, Disp: 30 capsule, Rfl: 11    OXcarbazepine (TRILEPTAL) 600 MG Tab, Take 600 mg by mouth once daily., Disp: , Rfl:     pravastatin (PRAVACHOL) 80 MG tablet, Take 80 mg by mouth once daily., Disp: , Rfl:     traZODone (DESYREL) 100 MG tablet, TAKE 2 TABLETS BY MOUTH ONCE DAILY AT BEDTIME, Disp: 60 tablet, Rfl: 0    [START ON 1/27/2023] dextroamphetamine-amphetamine (ADDERALL) 20 mg tablet, TAKE one tablet by mouth in the a.m. and  1/2 tablet AT 2PM, Disp: 45 tablet, Rfl: 0    [START ON 12/29/2022] dextroamphetamine-amphetamine (ADDERALL) 20 mg tablet, TAKE one tablet by mouth in the a.m. and  1/2 tablet AT 2PM, Disp: 45 tablet, Rfl: 0    dextroamphetamine-amphetamine (ADDERALL) 20 mg tablet, TAKE one tablet by mouth in the a.m. and  1/2 tablet AT 2PM, Disp: 45 tablet, Rfl: 0    [START ON 1/27/2023] dextroamphetamine-amphetamine 30 mg Tab, Take 1 tablet (30 mg total) by mouth every morning., Disp: 30 tablet, Rfl: 0    [START ON 12/29/2022] dextroamphetamine-amphetamine 30 mg Tab, Take 1 tablet (30 mg total) by  mouth every morning., Disp: 30 tablet, Rfl: 0    dextroamphetamine-amphetamine 30 mg Tab, Take 1 tablet (30 mg total) by mouth every morning., Disp: 30 tablet, Rfl: 0    Assessment & Plan:     Problem List Items Addressed This Visit          Psychiatric    Attention deficit hyperactivity disorder (ADHD) - Primary     Stable  Continue Adderall as prescribed; Rx sent;  reviewed  Report to ED for any CP or SOB  Keep appt with PCP for follow up         Relevant Medications    dextroamphetamine-amphetamine (ADDERALL) 20 mg tablet (Start on 1/27/2023)    dextroamphetamine-amphetamine (ADDERALL) 20 mg tablet (Start on 12/29/2022)    dextroamphetamine-amphetamine 30 mg Tab (Start on 1/27/2023)    dextroamphetamine-amphetamine 30 mg Tab (Start on 12/29/2022)    dextroamphetamine-amphetamine 30 mg Tab    dextroamphetamine-amphetamine (ADDERALL) 20 mg tablet    Other Relevant Orders    Ambulatory referral/consult to Outpatient Case Management

## 2022-11-29 NOTE — TELEPHONE ENCOUNTER
----- Message from Emelia Jordan sent at 11/28/2022 10:06 AM CST -----  Regarding: Meds  .Type:  RX Refill Request    Who Called: Pt  Refill or New Rx:Refill  RX Name and Strength:dextroamphetamine-amphetamine (ADDERALL) 20 mg tablet  How is the patient currently taking it? (ex. 1XDay):1/2 day  Is this a 30 day or 90 day RX:45  Preferred Pharmacy with phone number:[facility] [place] [address]  Local or Mail Order:Local  Ordering Provider:Trinity  Would the patient rather a call back or a response via [facility]? Call back  Best Call Back Number:[phone]  Additional Information:     .Type:  RX Refill Request    Who Called: Pt  Refill or New Rx:Refill  RX Name and Strength:dextroamphetamine-amphetamine (ADDERALL) 30 mg tablet  How is the patient currently taking it? (ex. 1XDay):1/2 day  Is this a 30 day or 90 day RX:45  Preferred Pharmacy with phone number:[facility] [place] [address]  Local or Mail Order:Local  Ordering Provider:Trinity  Would the patient rather a call back or a response via [facility]? Call back  Best Call Back Number:[phone]  Additional Information:

## 2022-11-29 NOTE — TELEPHONE ENCOUNTER
Our office spoke with patient and she is scheduled for an appointment today (she has been seen since August). Janay

## 2022-11-29 NOTE — ASSESSMENT & PLAN NOTE
Stable  Continue Adderall as prescribed; Rx sent;  reviewed  Report to ED for any CP or SOB  Keep appt with PCP for follow up

## 2022-11-30 ENCOUNTER — OUTPATIENT CASE MANAGEMENT (OUTPATIENT)
Dept: ADMINISTRATIVE | Facility: OTHER | Age: 47
End: 2022-11-30

## 2022-11-30 NOTE — LETTER
December 12, 2022             Dear Katharine,    Welcome to [facility] Complex Care Management Program.  It was a pleasure talking with you today.  My name is Mounika Hughes, and I look forward to being your Care Manager.  My goal is to help you function at the healthiest and highest level possible.  You can contact me directly at [phone].    As an [facility] patient, some of the services we may be able to provide include:      Development of an individualized care plan with a Registered Nurse    Connection with available resources and services     Coordinate communication among your care team members    Provide coaching and education    Help you understand your doctors treatment plan    All services provided by [facility]s Complex Care Managers and other care team members are coordinated with and communicated to your primary care team.      As part of your enrollment, you will be receiving education materials and more information about these services in your [facility] account, by phone or through the mail.  If you do not wish to participate or receive information, please contact our office at [phone].      Sincerely,        Mounika Hughes, RN  [facility]   Outpatient RN Complex Care Manager

## 2022-11-30 NOTE — LETTER
December 2, 2022    Katharine Scott  [address] [place] [id number]             [facility] [address] [place] [address] I am writing from the Outpatient Care Management Department at [facility].  I received a referral from BRYNN Correa NP to contact you regarding any needs you may have.  I have been unable to reach you by phone.   Please contact me if you would like to discuss any needs.     I can be reached at [phone]  Monday thru Thursday from 8:00am to 4:30pm, Friday 8:00am - 12noon.  [facility] also has a program with a nurse available 24/7 to answer questions or provide medical advice.  [facility] can be reached at [phone].     Sincerely,   Mounika Hughes RN

## 2022-12-12 ENCOUNTER — DOCUMENTATION ONLY (OUTPATIENT)
Dept: ADMINISTRATIVE | Facility: HOSPITAL | Age: 47
End: 2022-12-12

## 2022-12-12 NOTE — PROGRESS NOTES
Outpatient Care Management  Initial Patient Assessment    Patient: Katharine Scott  MRN: [id number]  Date of Service: 11/30/2022  Completed by: Mounika Hughes RN  Referral Date: 11/29/2022  Program: High Risk  Status: Ongoing  Effective Dates: 12/12/2022 - present  Responsible Staff: Mounika Hughes RN        Reason for Visit   Patient presents with    OPCM Chart Review    OPCM Enrollment Call    OPCM RN Third Assessment Attempt    Initial Assessment    Nursing Assessment    Plan Of Care    OPCM Welcome Letter     12/12/22       Brief Summary:  Katharine Scott was referred by JELANI Bishop for ADD, acquiring insurance /need assistance. Patient qualifies for program based on need for insurance coverage, risk score 13.3%.   Active problem list, medical, surgical and social history reviewed. Active comorbidities include DM, thyroid abnormality, bipolar. Areas of need identified by patient include need to acquire insurance coverage.   Complex care plan created with patient/caregiver input. By next encounter, patient agrees to communicate with [facility].   Next steps: refer to [facility]. Follow up in approximately one week with patient.   OVIDIO Hughes RN

## 2022-12-20 ENCOUNTER — OUTPATIENT CASE MANAGEMENT (OUTPATIENT)
Dept: ADMINISTRATIVE | Facility: OTHER | Age: 47
End: 2022-12-20

## 2022-12-27 ENCOUNTER — OUTPATIENT CASE MANAGEMENT (OUTPATIENT)
Dept: ADMINISTRATIVE | Facility: OTHER | Age: 47
End: 2022-12-27

## 2023-01-04 ENCOUNTER — OUTPATIENT CASE MANAGEMENT (OUTPATIENT)
Dept: ADMINISTRATIVE | Facility: OTHER | Age: 48
End: 2023-01-04

## 2023-01-09 RX ORDER — TRAZODONE HYDROCHLORIDE 100 MG/1
200 TABLET ORAL NIGHTLY
Qty: 60 TABLET | Refills: 3 | Status: SHIPPED | OUTPATIENT
Start: 2023-01-09 | End: 2023-03-08

## 2023-01-09 NOTE — TELEPHONE ENCOUNTER
----- Message from Emelia Jordan sent at 1/9/2023  2:17 PM CST -----  Regarding: med refill  .Type:  RX Refill Request    Who Called: Pt  Refill or New Rx:Refill  RX Name and Strength:traZODone (DESYREL) 100 MG tablet  How is the patient currently taking it? (ex. 1XDay):BID  Is this a 30 day or 90 day RX:60  Preferred Pharmacy with phone number:[facility] [place] [address]  Local or Mail Order:Local  Ordering Provider:Trinity  Would the patient rather a call back or a response via [facility]? Call back  Best Call Back Number:[phone]  Additional Information: Pt is completely out with no refills..

## 2023-01-11 ENCOUNTER — OUTPATIENT CASE MANAGEMENT (OUTPATIENT)
Dept: ADMINISTRATIVE | Facility: OTHER | Age: 48
End: 2023-01-11

## 2023-01-11 NOTE — PROGRESS NOTES
01/11/23 OPCM case closure. Unable to reach patient x 3, letter was also mailed.   OVIDIO Hughes RN

## 2023-02-22 ENCOUNTER — OFFICE VISIT (OUTPATIENT)
Dept: FAMILY MEDICINE | Facility: CLINIC | Age: 48
End: 2023-02-22
Payer: COMMERCIAL

## 2023-02-22 VITALS
TEMPERATURE: 99 F | HEIGHT: 65 IN | WEIGHT: 215.38 LBS | DIASTOLIC BLOOD PRESSURE: 86 MMHG | HEART RATE: 65 BPM | OXYGEN SATURATION: 99 % | BODY MASS INDEX: 35.88 KG/M2 | SYSTOLIC BLOOD PRESSURE: 132 MMHG | RESPIRATION RATE: 16 BRPM

## 2023-02-22 DIAGNOSIS — F31.9 BIPOLAR AFFECTIVE DISORDER, REMISSION STATUS UNSPECIFIED: ICD-10-CM

## 2023-02-22 DIAGNOSIS — Z12.11 COLON CANCER SCREENING: ICD-10-CM

## 2023-02-22 DIAGNOSIS — Z12.31 BREAST CANCER SCREENING BY MAMMOGRAM: ICD-10-CM

## 2023-02-22 DIAGNOSIS — F90.9 ATTENTION DEFICIT HYPERACTIVITY DISORDER (ADHD), UNSPECIFIED ADHD TYPE: ICD-10-CM

## 2023-02-22 DIAGNOSIS — E66.01 CLASS 2 SEVERE OBESITY WITH SERIOUS COMORBIDITY AND BODY MASS INDEX (BMI) OF 35.0 TO 35.9 IN ADULT, UNSPECIFIED OBESITY TYPE: ICD-10-CM

## 2023-02-22 DIAGNOSIS — E11.65 TYPE 2 DIABETES MELLITUS WITH HYPERGLYCEMIA, WITHOUT LONG-TERM CURRENT USE OF INSULIN: ICD-10-CM

## 2023-02-22 DIAGNOSIS — E78.2 MIXED HYPERLIPIDEMIA: ICD-10-CM

## 2023-02-22 DIAGNOSIS — Z28.21 TETANUS, DIPHTHERIA, AND ACELLULAR PERTUSSIS (TDAP) VACCINATION DECLINED: ICD-10-CM

## 2023-02-22 DIAGNOSIS — E03.9 HYPOTHYROIDISM, UNSPECIFIED TYPE: ICD-10-CM

## 2023-02-22 DIAGNOSIS — Z00.00 WELLNESS EXAMINATION: Primary | ICD-10-CM

## 2023-02-22 PROCEDURE — 3075F SYST BP GE 130 - 139MM HG: CPT | Mod: CPTII,,, | Performed by: FAMILY MEDICINE

## 2023-02-22 PROCEDURE — 99396 PR PREVENTIVE VISIT,EST,40-64: ICD-10-PCS | Mod: ,,, | Performed by: FAMILY MEDICINE

## 2023-02-22 PROCEDURE — 3075F PR MOST RECENT SYSTOLIC BLOOD PRESS GE 130-139MM HG: ICD-10-PCS | Mod: CPTII,,, | Performed by: FAMILY MEDICINE

## 2023-02-22 PROCEDURE — 99396 PREV VISIT EST AGE 40-64: CPT | Mod: ,,, | Performed by: FAMILY MEDICINE

## 2023-02-22 PROCEDURE — 3008F BODY MASS INDEX DOCD: CPT | Mod: CPTII,,, | Performed by: FAMILY MEDICINE

## 2023-02-22 PROCEDURE — 1159F MED LIST DOCD IN RCRD: CPT | Mod: CPTII,,, | Performed by: FAMILY MEDICINE

## 2023-02-22 PROCEDURE — 1160F PR REVIEW ALL MEDS BY PRESCRIBER/CLIN PHARMACIST DOCUMENTED: ICD-10-PCS | Mod: CPTII,,, | Performed by: FAMILY MEDICINE

## 2023-02-22 PROCEDURE — 3079F PR MOST RECENT DIASTOLIC BLOOD PRESSURE 80-89 MM HG: ICD-10-PCS | Mod: CPTII,,, | Performed by: FAMILY MEDICINE

## 2023-02-22 PROCEDURE — 3079F DIAST BP 80-89 MM HG: CPT | Mod: CPTII,,, | Performed by: FAMILY MEDICINE

## 2023-02-22 PROCEDURE — 3008F PR BODY MASS INDEX (BMI) DOCUMENTED: ICD-10-PCS | Mod: CPTII,,, | Performed by: FAMILY MEDICINE

## 2023-02-22 PROCEDURE — 1159F PR MEDICATION LIST DOCUMENTED IN MEDICAL RECORD: ICD-10-PCS | Mod: CPTII,,, | Performed by: FAMILY MEDICINE

## 2023-02-22 PROCEDURE — 1160F RVW MEDS BY RX/DR IN RCRD: CPT | Mod: CPTII,,, | Performed by: FAMILY MEDICINE

## 2023-02-22 RX ORDER — DEXTROAMPHETAMINE SACCHARATE, AMPHETAMINE ASPARTATE, DEXTROAMPHETAMINE SULFATE AND AMPHETAMINE SULFATE 7.5; 7.5; 7.5; 7.5 MG/1; MG/1; MG/1; MG/1
30 TABLET ORAL EVERY MORNING
Qty: 30 TABLET | Refills: 0 | Status: SHIPPED | OUTPATIENT
Start: 2023-04-28 | End: 2023-05-22 | Stop reason: SDUPTHER

## 2023-02-22 RX ORDER — DEXTROAMPHETAMINE SACCHARATE, AMPHETAMINE ASPARTATE, DEXTROAMPHETAMINE SULFATE AND AMPHETAMINE SULFATE 5; 5; 5; 5 MG/1; MG/1; MG/1; MG/1
TABLET ORAL
Qty: 45 TABLET | Refills: 0 | Status: SHIPPED | OUTPATIENT
Start: 2023-02-27 | End: 2023-05-22

## 2023-02-22 RX ORDER — DEXTROAMPHETAMINE SACCHARATE, AMPHETAMINE ASPARTATE, DEXTROAMPHETAMINE SULFATE AND AMPHETAMINE SULFATE 5; 5; 5; 5 MG/1; MG/1; MG/1; MG/1
TABLET ORAL
Qty: 45 TABLET | Refills: 0 | Status: SHIPPED | OUTPATIENT
Start: 2023-04-28 | End: 2023-05-22 | Stop reason: SDUPTHER

## 2023-02-22 RX ORDER — DEXTROAMPHETAMINE SACCHARATE, AMPHETAMINE ASPARTATE, DEXTROAMPHETAMINE SULFATE AND AMPHETAMINE SULFATE 7.5; 7.5; 7.5; 7.5 MG/1; MG/1; MG/1; MG/1
30 TABLET ORAL EVERY MORNING
Qty: 30 TABLET | Refills: 0 | Status: SHIPPED | OUTPATIENT
Start: 2023-02-27 | End: 2023-03-29

## 2023-02-22 RX ORDER — AZELASTINE HYDROCHLORIDE, FLUTICASONE PROPIONATE 137; 50 UG/1; UG/1
1 SPRAY, METERED NASAL 2 TIMES DAILY
Qty: 23 G | Refills: 11 | Status: SHIPPED | OUTPATIENT
Start: 2023-02-22 | End: 2023-05-22

## 2023-02-22 RX ORDER — FENOFIBRATE 48 MG/1
48 TABLET, FILM COATED ORAL
COMMUNITY
Start: 2023-02-09 | End: 2023-05-11

## 2023-02-22 RX ORDER — GLIPIZIDE 10 MG/1
20 TABLET ORAL 2 TIMES DAILY
COMMUNITY
Start: 2023-01-31 | End: 2023-04-03

## 2023-02-22 RX ORDER — DULAGLUTIDE 0.75 MG/.5ML
0.75 INJECTION, SOLUTION SUBCUTANEOUS
Qty: 4 PEN | Refills: 11 | Status: SHIPPED | OUTPATIENT
Start: 2023-02-22 | End: 2023-04-05

## 2023-02-22 RX ORDER — DEXTROAMPHETAMINE SACCHARATE, AMPHETAMINE ASPARTATE, DEXTROAMPHETAMINE SULFATE AND AMPHETAMINE SULFATE 5; 5; 5; 5 MG/1; MG/1; MG/1; MG/1
TABLET ORAL
Qty: 45 TABLET | Refills: 0 | Status: SHIPPED | OUTPATIENT
Start: 2023-03-29 | End: 2023-05-22

## 2023-02-22 RX ORDER — DEXTROAMPHETAMINE SACCHARATE, AMPHETAMINE ASPARTATE, DEXTROAMPHETAMINE SULFATE AND AMPHETAMINE SULFATE 7.5; 7.5; 7.5; 7.5 MG/1; MG/1; MG/1; MG/1
30 TABLET ORAL EVERY MORNING
Qty: 30 TABLET | Refills: 0 | Status: SHIPPED | OUTPATIENT
Start: 2023-03-29 | End: 2023-04-28

## 2023-02-22 NOTE — ASSESSMENT & PLAN NOTE
Fasting labs ordered. Will call with results when available  Declines pap today. Will plan to do at next appt.   mmg ordered  cologuard ordered  Declines immunizations today

## 2023-02-22 NOTE — ASSESSMENT & PLAN NOTE
Garett reviewed. Controlled substance policy signed today. Postdated refills sent in as requested. Reminded patient this is a controlled medication and must be seen q3 months while on.  Patient is agreeable to plan and verbalized understanding

## 2023-02-22 NOTE — ASSESSMENT & PLAN NOTE
Lab Results   Component Value Date    TSH 0.007 (L) 10/25/2018       Stable on synthroid. Labs ordered

## 2023-02-22 NOTE — PROGRESS NOTES
Subjective:        Patient ID: Katharine Scott is a 47 y.o. female.    Chief Complaint: Follow-up and Annual Exam (Wellness/Patient needs blood work orders)       Patient presents to clinic for her wellness visit.  She is due for labs. Asking for hormone levels to be added to her wellness     She has ADHD and is on Adderall 40 mg (30+10) in the 6 morning and Adderall 10 mg at 2pm. needs postdated refills of this.     The patient also has diabetes and thyroid disease(hypothyroidism) which was followed by Dr. Rodriguez. has not seen him since lost insurance. was on radioactive iodine. on synthroid 175mcg daily. tsh 6/2021 wnl. C/o hair and face oily. Noted hair growth on chin.       She has diabetes.  when she is on trulicity- cbgs 127 average. she is prescribed trulicity 1.5mg weekly and glipizide 10mg (20mg BID). Cbgs in 400s. Following dmii diet. She went to [facility] for her eye exam on 2/2020- a copy is in the chart. a1c 6.3 3/2021. Foot exam today. Reports blurry vision.     She has hyperlipidemia and is compliant with her medications. On fenofibrate     She has bipolar/depression and saw psychiatry, Dr. Holly Camarena in the past. on wellbutrin and oxcarbazepine and effexor in past.  Currently on trileptal and trazodone.  Took lexapro- made wired and could not sleep.  Stopped.      Has seasonal allergies and is on dymista.    She is due for a mammogram. We will order. Has not had colon cancer screening. We will order cologuard. Lmp end of 1/2023. Cycles regular.  Declines pap today. Will plan on at next appt.      She is ALLERGIC to aspirin and metformin. She does not drink alcohol or smoke. declines tdap and Shingrix. completed covid series and booster.    Review of Systems   Constitutional:  Positive for unexpected weight change.   HENT: Negative.     Respiratory: Negative.     Cardiovascular: Negative.    Gastrointestinal: Negative.    Genitourinary: Negative.        Review of patient's allergies  "indicates:   Allergen Reactions    Aspirin     Metformin      Other reaction(s): makes ill    Morphine     Penicillin Rash      Vitals:    02/22/23 1548   BP: 132/86   BP Location: Left arm   Pulse: 65   Resp: 16   Temp: 98.6 °F (37 °C)   TempSrc: Temporal   SpO2: 99%   Weight: 97.7 kg (215 lb 6.4 oz)   Height: 5' 5" (1.651 m)      Social History     Socioeconomic History    Marital status:    Occupational History    Occupation: pharmacy   Tobacco Use    Smoking status: Never    Smokeless tobacco: Never   Substance and Sexual Activity    Alcohol use: Not Currently    Drug use: Never      History reviewed. No pertinent family history.       Objective:     Physical Exam  Vitals and nursing note reviewed.   Constitutional:       Appearance: Normal appearance. She is obese.   HENT:      Head: Normocephalic and atraumatic.      Nose: Nose normal.      Mouth/Throat:      Mouth: Mucous membranes are moist.      Pharynx: Oropharynx is clear.   Eyes:      Extraocular Movements: Extraocular movements intact.   Cardiovascular:      Rate and Rhythm: Normal rate and regular rhythm.      Pulses: Normal pulses.           Dorsalis pedis pulses are 2+ on the right side and 2+ on the left side.        Posterior tibial pulses are 2+ on the right side and 2+ on the left side.      Heart sounds: Normal heart sounds.   Pulmonary:      Effort: Pulmonary effort is normal.      Breath sounds: Normal breath sounds.   Musculoskeletal:         General: Normal range of motion.      Cervical back: Normal range of motion.        Feet:    Feet:      Right foot:      Protective Sensation: 8 sites tested.  8 sites sensed.      Skin integrity: Skin integrity normal.      Left foot:      Protective Sensation: 8 sites tested.  8 sites sensed.      Skin integrity: Skin integrity normal.   Skin:     General: Skin is warm and dry.   Neurological:      General: No focal deficit present.      Mental Status: She is alert and oriented to person, place, " and time. Mental status is at baseline.   Psychiatric:         Mood and Affect: Mood normal.     Current Outpatient Medications on File Prior to Visit   Medication Sig Dispense Refill    dextroamphetamine-amphetamine (ADDERALL) 20 mg tablet TAKE one tablet by mouth in the a.m. and  1/2 tablet AT 2PM 45 tablet 0    dextroamphetamine-amphetamine (ADDERALL) 20 mg tablet TAKE one tablet by mouth in the a.m. and  1/2 tablet AT 2PM 45 tablet 0    dextroamphetamine-amphetamine 30 mg Tab Take 1 tablet (30 mg total) by mouth every morning. 30 tablet 0    dextroamphetamine-amphetamine 30 mg Tab Take 1 tablet (30 mg total) by mouth every morning. 30 tablet 0    fenofibrate (TRICOR) 48 MG tablet Take 48 mg by mouth.      glipiZIDE (GLUCOTROL) 10 MG tablet Take 20 mg by mouth 2 (two) times daily.      levothyroxine (SYNTHROID, LEVOTHROID) 175 MCG tablet TAKE ONE TABLET BY MOUTH DAILY 30 tablet 5    omeprazole (PRILOSEC) 40 MG capsule TAKE ONE CAPSULE BY MOUTH EVERY DAY 30 capsule 11    OXcarbazepine (TRILEPTAL) 600 MG Tab Take 600 mg by mouth once daily.      traZODone (DESYREL) 100 MG tablet Take 2 tablets (200 mg total) by mouth every evening. 60 tablet 3    [DISCONTINUED] azelastine-fluticasone (DYMISTA) 137-50 mcg/spray Spry nassal spray Dymista 137 mcg-50 mcg/spray nasal spray   1 spray to each nostril twice a day.      [DISCONTINUED] dextroamphetamine-amphetamine (ADDERALL) 20 mg tablet TAKE one tablet by mouth in the a.m. and  1/2 tablet AT 2PM 45 tablet 0    [DISCONTINUED] dextroamphetamine-amphetamine 30 mg Tab Take 1 tablet (30 mg total) by mouth every morning. 30 tablet 0    [DISCONTINUED] EScitalopram oxalate (LEXAPRO) 10 MG tablet Take 1 tablet (10 mg total) by mouth once daily. 30 tablet 11    [DISCONTINUED] fenofibrate (TRICOR) 145 MG tablet Take 48 mg by mouth once daily. Take 1 tablet po once daily      [DISCONTINUED] hydrOXYzine pamoate (VISTARIL) 25 MG Cap Take 1 capsule (25 mg total) by mouth every 4 (four)  hours as needed (anxiety). 40 capsule 3    [DISCONTINUED] pravastatin (PRAVACHOL) 80 MG tablet Take 80 mg by mouth once daily.      [DISCONTINUED] traZODone (DESYREL) 100 MG tablet TAKE 2 TABLETS BY MOUTH ONCE DAILY AT BEDTIME 60 tablet 0     No current facility-administered medications on file prior to visit.     Health Maintenance   Topic Date Due    Hepatitis C Screening  Never done    Mammogram  09/27/2018    TETANUS VACCINE  02/22/2024 (Originally 3/3/1993)    Lipid Panel  06/06/2023      Results for orders placed or performed in visit on 12/12/22   HPV DNA probe, amplified    Specimen: Cervix; Genital   Result Value Ref Range    HPV DNA None Detected None Detected   HM PAP SMEAR   Result Value Ref Range    PAP Recommendation External No follow-up frequency specified     Pap Negative for intraephithelial lesion or malignancy Negative for intraephithelial lesion or malignancy, Other          Assessment & Plan:     Active Problem List with Overview Notes    Diagnosis Date Noted    Wellness examination 02/22/2023    Breast cancer screening by mammogram 02/22/2023    Colon cancer screening 02/22/2023    Attention deficit hyperactivity disorder (ADHD) 08/02/2022    Bipolar disorder 08/02/2022    Diabetes mellitus 08/02/2022    Hyperlipidemia 08/02/2022    Hypothyroidism 08/02/2022    Obesity 08/02/2022    Tetanus, diphtheria, and acellular pertussis (Tdap) vaccination declined 08/02/2022       1. Wellness examination  Assessment & Plan:  Fasting labs ordered. Will call with results when available  Declines pap today. Will plan to do at next appt.   mmg ordered  cologuard ordered  Declines immunizations today    Orders:  -     CBC Auto Differential; Future; Expected date: 02/22/2023  -     Comprehensive Metabolic Panel; Future; Expected date: 02/22/2023  -     Lipid Panel; Future; Expected date: 02/22/2023  -     TSH; Future; Expected date: 02/22/2023  -     Hemoglobin A1C; Future; Expected date: 02/22/2023  -      Urinalysis; Future; Expected date: 02/22/2023  -     HIV 1/2 Ag/Ab (4th Gen); Future; Expected date: 02/22/2023  -     Microalbumin/Creatinine Ratio, Urine; Future; Expected date: 02/22/2023  -     Hepatitis C Antibody; Future; Expected date: 02/22/2023  -     T3, Free (OLG); Future; Expected date: 02/22/2023  -     T4, Free; Future; Expected date: 02/22/2023  -     THYROID PEROXIDASE (TPO); Future; Expected date: 02/22/2023  -     Estradiol; Future; Expected date: 02/22/2023  -     Progesterone; Future; Expected date: 02/22/2023  -     Luteinizing Hormone; Future; Expected date: 02/22/2023  -     Follicle Stimulating Hormone; Future; Expected date: 02/22/2023    2. Attention deficit hyperactivity disorder (ADHD), unspecified ADHD type  Assessment & Plan:  [facility] reviewed. Controlled substance policy signed today. Postdated refills sent in as requested. Reminded patient this is a controlled medication and must be seen q3 months while on.  Patient is agreeable to plan and verbalized understanding      Orders:  -     CBC Auto Differential; Future; Expected date: 02/22/2023  -     Comprehensive Metabolic Panel; Future; Expected date: 02/22/2023  -     Lipid Panel; Future; Expected date: 02/22/2023  -     TSH; Future; Expected date: 02/22/2023  -     Hemoglobin A1C; Future; Expected date: 02/22/2023  -     Urinalysis; Future; Expected date: 02/22/2023  -     HIV 1/2 Ag/Ab (4th Gen); Future; Expected date: 02/22/2023  -     Microalbumin/Creatinine Ratio, Urine; Future; Expected date: 02/22/2023  -     Hepatitis C Antibody; Future; Expected date: 02/22/2023  -     T3, Free (OLG); Future; Expected date: 02/22/2023  -     T4, Free; Future; Expected date: 02/22/2023  -     THYROID PEROXIDASE (TPO); Future; Expected date: 02/22/2023  -     Estradiol; Future; Expected date: 02/22/2023  -     Progesterone; Future; Expected date: 02/22/2023  -     Luteinizing Hormone; Future; Expected date: 02/22/2023  -     Follicle  Stimulating Hormone; Future; Expected date: 02/22/2023  -     dextroamphetamine-amphetamine 30 mg Tab; Take 1 tablet (30 mg total) by mouth every morning.  Dispense: 30 tablet; Refill: 0  -     dextroamphetamine-amphetamine 30 mg Tab; Take 1 tablet (30 mg total) by mouth every morning.  Dispense: 30 tablet; Refill: 0  -     dextroamphetamine-amphetamine 30 mg Tab; Take 1 tablet (30 mg total) by mouth every morning.  Dispense: 30 tablet; Refill: 0  -     dextroamphetamine-amphetamine (ADDERALL) 20 mg tablet; TAKE one tablet by mouth in the a.m. and  1/2 tablet AT 2PM  Dispense: 45 tablet; Refill: 0  -     dextroamphetamine-amphetamine (ADDERALL) 20 mg tablet; TAKE one tablet by mouth in the a.m. and  1/2 tablet AT 2PM  Dispense: 45 tablet; Refill: 0  -     dextroamphetamine-amphetamine (ADDERALL) 20 mg tablet; TAKE one tablet by mouth in the a.m. and  1/2 tablet AT 2PM  Dispense: 45 tablet; Refill: 0    3. Bipolar affective disorder, remission status unspecified  Assessment & Plan:  Stable on trileptal and trazodone.  Will contact clinic for concerns. Patient is agreeable to plan and verbalized understanding    Orders:  -     CBC Auto Differential; Future; Expected date: 02/22/2023  -     Comprehensive Metabolic Panel; Future; Expected date: 02/22/2023  -     Lipid Panel; Future; Expected date: 02/22/2023  -     TSH; Future; Expected date: 02/22/2023  -     Hemoglobin A1C; Future; Expected date: 02/22/2023  -     Urinalysis; Future; Expected date: 02/22/2023  -     HIV 1/2 Ag/Ab (4th Gen); Future; Expected date: 02/22/2023  -     Microalbumin/Creatinine Ratio, Urine; Future; Expected date: 02/22/2023  -     Hepatitis C Antibody; Future; Expected date: 02/22/2023  -     T3, Free (OLG); Future; Expected date: 02/22/2023  -     T4, Free; Future; Expected date: 02/22/2023  -     THYROID PEROXIDASE (TPO); Future; Expected date: 02/22/2023  -     Estradiol; Future; Expected date: 02/22/2023  -     Progesterone; Future;  Expected date: 02/22/2023  -     Luteinizing Hormone; Future; Expected date: 02/22/2023  -     Follicle Stimulating Hormone; Future; Expected date: 02/22/2023    4. Mixed hyperlipidemia  Assessment & Plan:  On  fenofibrate    Orders:  -     CBC Auto Differential; Future; Expected date: 02/22/2023  -     Comprehensive Metabolic Panel; Future; Expected date: 02/22/2023  -     Lipid Panel; Future; Expected date: 02/22/2023  -     TSH; Future; Expected date: 02/22/2023  -     Hemoglobin A1C; Future; Expected date: 02/22/2023  -     Urinalysis; Future; Expected date: 02/22/2023  -     HIV 1/2 Ag/Ab (4th Gen); Future; Expected date: 02/22/2023  -     Microalbumin/Creatinine Ratio, Urine; Future; Expected date: 02/22/2023  -     Hepatitis C Antibody; Future; Expected date: 02/22/2023  -     T3, Free (OLG); Future; Expected date: 02/22/2023  -     T4, Free; Future; Expected date: 02/22/2023  -     THYROID PEROXIDASE (TPO); Future; Expected date: 02/22/2023  -     Estradiol; Future; Expected date: 02/22/2023  -     Progesterone; Future; Expected date: 02/22/2023  -     Luteinizing Hormone; Future; Expected date: 02/22/2023  -     Follicle Stimulating Hormone; Future; Expected date: 02/22/2023    5. Hypothyroidism, unspecified type  Assessment & Plan:  Lab Results   Component Value Date    TSH 0.007 (L) 10/25/2018       Stable on synthroid. Labs ordered    Orders:  -     CBC Auto Differential; Future; Expected date: 02/22/2023  -     Comprehensive Metabolic Panel; Future; Expected date: 02/22/2023  -     Lipid Panel; Future; Expected date: 02/22/2023  -     TSH; Future; Expected date: 02/22/2023  -     Hemoglobin A1C; Future; Expected date: 02/22/2023  -     Urinalysis; Future; Expected date: 02/22/2023  -     HIV 1/2 Ag/Ab (4th Gen); Future; Expected date: 02/22/2023  -     Microalbumin/Creatinine Ratio, Urine; Future; Expected date: 02/22/2023  -     Hepatitis C Antibody; Future; Expected date: 02/22/2023  -     T3, Free  (OLG); Future; Expected date: 02/22/2023  -     T4, Free; Future; Expected date: 02/22/2023  -     THYROID PEROXIDASE (TPO); Future; Expected date: 02/22/2023  -     Estradiol; Future; Expected date: 02/22/2023  -     Progesterone; Future; Expected date: 02/22/2023  -     Luteinizing Hormone; Future; Expected date: 02/22/2023  -     Follicle Stimulating Hormone; Future; Expected date: 02/22/2023    6. Type 2 diabetes mellitus with hyperglycemia, without long-term current use of insulin  Assessment & Plan:  Lab Results   Component Value Date    HGBA1C 7.4 (H) 06/06/2018     a1c 6.3 3/2021  Urine micro today  Foot exam today  [facility] exam at [facility]    Labs ordered    On glipizide and statin. Reports concern for elevated sugars.  Will restart on trulicity now that she has insurance.  Will send in 0.75mg SQ once weekly rx.  Will give sample of 1.5mg since we do not have 0.75mg samples. Take as directed. Monitor cbgs closely. Contact clinic for concerns.     Orders:  -     CBC Auto Differential; Future; Expected date: 02/22/2023  -     Comprehensive Metabolic Panel; Future; Expected date: 02/22/2023  -     Lipid Panel; Future; Expected date: 02/22/2023  -     TSH; Future; Expected date: 02/22/2023  -     Hemoglobin A1C; Future; Expected date: 02/22/2023  -     Urinalysis; Future; Expected date: 02/22/2023  -     HIV 1/2 Ag/Ab (4th Gen); Future; Expected date: 02/22/2023  -     Microalbumin/Creatinine Ratio, Urine; Future; Expected date: 02/22/2023  -     Hepatitis C Antibody; Future; Expected date: 02/22/2023  -     T3, Free (OLG); Future; Expected date: 02/22/2023  -     T4, Free; Future; Expected date: 02/22/2023  -     THYROID PEROXIDASE (TPO); Future; Expected date: 02/22/2023  -     Estradiol; Future; Expected date: 02/22/2023  -     Progesterone; Future; Expected date: 02/22/2023  -     Luteinizing Hormone; Future; Expected date: 02/22/2023  -     Follicle Stimulating Hormone; Future; Expected date:  02/22/2023  -     dulaglutide (TRULICITY) 0.75 mg/0.5 mL pen injector; Inject 0.75 mg into the skin every 7 days.  Dispense: 4 pen; Refill: 11    7. Breast cancer screening by mammogram  Assessment & Plan:  mmg ordered    Orders:  -     CBC Auto Differential; Future; Expected date: 02/22/2023  -     Comprehensive Metabolic Panel; Future; Expected date: 02/22/2023  -     Lipid Panel; Future; Expected date: 02/22/2023  -     TSH; Future; Expected date: 02/22/2023  -     Hemoglobin A1C; Future; Expected date: 02/22/2023  -     Urinalysis; Future; Expected date: 02/22/2023  -     HIV 1/2 Ag/Ab (4th Gen); Future; Expected date: 02/22/2023  -     Microalbumin/Creatinine Ratio, Urine; Future; Expected date: 02/22/2023  -     Hepatitis C Antibody; Future; Expected date: 02/22/2023  -     T3, Free (OLG); Future; Expected date: 02/22/2023  -     T4, Free; Future; Expected date: 02/22/2023  -     THYROID PEROXIDASE (TPO); Future; Expected date: 02/22/2023  -     Estradiol; Future; Expected date: 02/22/2023  -     Progesterone; Future; Expected date: 02/22/2023  -     Luteinizing Hormone; Future; Expected date: 02/22/2023  -     Follicle Stimulating Hormone; Future; Expected date: 02/22/2023  -     Mammo Digital Screening Bilat; Future; Expected date: 02/22/2023    8. Colon cancer screening  Assessment & Plan:  cologuard ordered    Orders:  -     CBC Auto Differential; Future; Expected date: 02/22/2023  -     Comprehensive Metabolic Panel; Future; Expected date: 02/22/2023  -     Lipid Panel; Future; Expected date: 02/22/2023  -     TSH; Future; Expected date: 02/22/2023  -     Hemoglobin A1C; Future; Expected date: 02/22/2023  -     Urinalysis; Future; Expected date: 02/22/2023  -     HIV 1/2 Ag/Ab (4th Gen); Future; Expected date: 02/22/2023  -     Microalbumin/Creatinine Ratio, Urine; Future; Expected date: 02/22/2023  -     Hepatitis C Antibody; Future; Expected date: 02/22/2023  -     T3, Free (OLG); Future; Expected date:  02/22/2023  -     T4, Free; Future; Expected date: 02/22/2023  -     THYROID PEROXIDASE (TPO); Future; Expected date: 02/22/2023  -     Estradiol; Future; Expected date: 02/22/2023  -     Progesterone; Future; Expected date: 02/22/2023  -     Luteinizing Hormone; Future; Expected date: 02/22/2023  -     Follicle Stimulating Hormone; Future; Expected date: 02/22/2023  -     Cologuard Screening (Multitarget Stool DNA); Future; Expected date: 02/22/2023    9. Class 2 severe obesity with serious comorbidity and body mass index (BMI) of 35.0 to 35.9 in adult, unspecified obesity type  Assessment & Plan:  Encouraged lifestyle change      10. Tetanus, diphtheria, and acellular pertussis (Tdap) vaccination declined  Assessment & Plan:  Declines immunizations today      Other orders  -     azelastine-fluticasone (DYMISTA) 137-50 mcg/spray Spry nassal spray; 1 spray by Each Nostril route 2 (two) times daily.  Dispense: 23 g; Refill: 11         Follow up in about 3 months (around 5/22/2023) for ADHD.

## 2023-02-22 NOTE — ASSESSMENT & PLAN NOTE
Stable on trileptal and trazodone.  Will contact clinic for concerns. Patient is agreeable to plan and verbalized understanding

## 2023-02-22 NOTE — ASSESSMENT & PLAN NOTE
Lab Results   Component Value Date    HGBA1C 7.4 (H) 06/06/2018     a1c 6.3 3/2021  Urine micro today  Foot exam today  Eye exam at [facility]    Labs ordered    On glipizide and statin. Reports concern for elevated sugars.  Will restart on trulicity now that she has insurance.  Will send in 0.75mg SQ once weekly rx.  Will give sample of 1.5mg since we do not have 0.75mg samples. Take as directed. Monitor cbgs closely. Contact clinic for concerns.

## 2023-02-23 ENCOUNTER — LAB VISIT (OUTPATIENT)
Dept: LAB | Facility: HOSPITAL | Age: 48
End: 2023-02-23
Attending: FAMILY MEDICINE
Payer: COMMERCIAL

## 2023-02-23 DIAGNOSIS — Z00.00 WELLNESS EXAMINATION: ICD-10-CM

## 2023-02-23 DIAGNOSIS — F31.9 BIPOLAR AFFECTIVE DISORDER, REMISSION STATUS UNSPECIFIED: ICD-10-CM

## 2023-02-23 DIAGNOSIS — E03.9 HYPOTHYROIDISM, UNSPECIFIED TYPE: ICD-10-CM

## 2023-02-23 DIAGNOSIS — Z12.11 COLON CANCER SCREENING: ICD-10-CM

## 2023-02-23 DIAGNOSIS — E11.65 TYPE 2 DIABETES MELLITUS WITH HYPERGLYCEMIA, WITHOUT LONG-TERM CURRENT USE OF INSULIN: ICD-10-CM

## 2023-02-23 DIAGNOSIS — F90.9 ATTENTION DEFICIT HYPERACTIVITY DISORDER (ADHD), UNSPECIFIED ADHD TYPE: ICD-10-CM

## 2023-02-23 DIAGNOSIS — Z12.31 BREAST CANCER SCREENING BY MAMMOGRAM: ICD-10-CM

## 2023-02-23 DIAGNOSIS — E78.2 MIXED HYPERLIPIDEMIA: ICD-10-CM

## 2023-02-23 LAB
ALBUMIN SERPL-MCNC: 3.7 G/DL (ref 3.5–5)
ALBUMIN/GLOB SERPL: 1.1 RATIO (ref 1.1–2)
ALP SERPL-CCNC: 69 UNIT/L (ref 40–150)
ALT SERPL-CCNC: 12 UNIT/L (ref 0–55)
APPEARANCE UR: ABNORMAL
AST SERPL-CCNC: 11 UNIT/L (ref 5–34)
BASOPHILS # BLD AUTO: 0.05 X10(3)/MCL (ref 0–0.2)
BASOPHILS NFR BLD AUTO: 0.7 %
BILIRUB UR QL STRIP.AUTO: NEGATIVE MG/DL
BILIRUBIN DIRECT+TOT PNL SERPL-MCNC: 0.4 MG/DL
BUN SERPL-MCNC: 8.7 MG/DL (ref 7–18.7)
CALCIUM SERPL-MCNC: 9.2 MG/DL (ref 8.4–10.2)
CHLORIDE SERPL-SCNC: 104 MMOL/L (ref 98–107)
CHOLEST SERPL-MCNC: 212 MG/DL
CHOLEST/HDLC SERPL: 5 {RATIO} (ref 0–5)
CO2 SERPL-SCNC: 20 MMOL/L (ref 22–29)
COLOR UR AUTO: ABNORMAL
CREAT SERPL-MCNC: 0.8 MG/DL (ref 0.55–1.02)
EOSINOPHIL # BLD AUTO: 0.14 X10(3)/MCL (ref 0–0.9)
EOSINOPHIL NFR BLD AUTO: 1.9 %
ERYTHROCYTE [DISTWIDTH] IN BLOOD BY AUTOMATED COUNT: 12.1 % (ref 11.5–17)
EST. AVERAGE GLUCOSE BLD GHB EST-MCNC: 208.7 MG/DL
ESTRADIOL SERPL HS-MCNC: 48 PG/ML
FSH SERPL-ACNC: 3.24 MIU/ML
GFR SERPLBLD CREATININE-BSD FMLA CKD-EPI: >60 MLS/MIN/1.73/M2
GLOBULIN SER-MCNC: 3.5 GM/DL (ref 2.4–3.5)
GLUCOSE SERPL-MCNC: 280 MG/DL (ref 74–100)
GLUCOSE UR QL STRIP.AUTO: >=1000 MG/DL
HBA1C MFR BLD: 8.9 %
HCT VFR BLD AUTO: 40.8 % (ref 37–47)
HCV AB SERPL QL IA: NONREACTIVE
HDLC SERPL-MCNC: 40 MG/DL (ref 35–60)
HGB BLD-MCNC: 14.2 G/DL (ref 12–16)
HIV 1+2 AB+HIV1 P24 AG SERPL QL IA: NONREACTIVE
IMM GRANULOCYTES # BLD AUTO: 0.01 X10(3)/MCL (ref 0–0.04)
IMM GRANULOCYTES NFR BLD AUTO: 0.1 %
KETONES UR QL STRIP.AUTO: NEGATIVE MG/DL
LDLC SERPL CALC-MCNC: 136 MG/DL (ref 50–140)
LEUKOCYTE ESTERASE UR QL STRIP.AUTO: NEGATIVE UNIT/L
LH SERPL-ACNC: 1.38 MIU/ML
LYMPHOCYTES # BLD AUTO: 1.99 X10(3)/MCL (ref 0.6–4.6)
LYMPHOCYTES NFR BLD AUTO: 26.8 %
MCH RBC QN AUTO: 30.2 PG
MCHC RBC AUTO-ENTMCNC: 34.8 G/DL (ref 33–36)
MCV RBC AUTO: 86.8 FL (ref 80–94)
MONOCYTES # BLD AUTO: 0.38 X10(3)/MCL (ref 0.1–1.3)
MONOCYTES NFR BLD AUTO: 5.1 %
NEUTROPHILS # BLD AUTO: 4.86 X10(3)/MCL (ref 2.1–9.2)
NEUTROPHILS NFR BLD AUTO: 65.4 %
NITRITE UR QL STRIP.AUTO: NEGATIVE
NRBC BLD AUTO-RTO: 0 %
PH UR STRIP.AUTO: 5.5 [PH]
PLATELET # BLD AUTO: 310 X10(3)/MCL (ref 130–400)
PMV BLD AUTO: 9.8 FL (ref 7.4–10.4)
POTASSIUM SERPL-SCNC: 4.1 MMOL/L (ref 3.5–5.1)
PROGEST SERPL-MCNC: 4.2 NG/ML
PROT SERPL-MCNC: 7.2 GM/DL (ref 6.4–8.3)
PROT UR QL STRIP.AUTO: NEGATIVE MG/DL
RBC # BLD AUTO: 4.7 X10(6)/MCL (ref 4.2–5.4)
RBC UR QL AUTO: NEGATIVE UNIT/L
SODIUM SERPL-SCNC: 135 MMOL/L (ref 136–145)
SP GR UR STRIP.AUTO: 1.02
T3FREE SERPL-MCNC: 2.26 PG/ML (ref 1.57–3.91)
T4 FREE SERPL-MCNC: 1.15 NG/DL (ref 0.7–1.48)
TRIGL SERPL-MCNC: 178 MG/DL (ref 37–140)
TSH SERPL-ACNC: 0.55 UIU/ML (ref 0.35–4.94)
UROBILINOGEN UR STRIP-ACNC: 0.2 MG/DL
VLDLC SERPL CALC-MCNC: 36 MG/DL
WBC # SPEC AUTO: 7.4 X10(3)/MCL (ref 4.5–11.5)

## 2023-02-23 PROCEDURE — 82670 ASSAY OF TOTAL ESTRADIOL: CPT

## 2023-02-23 PROCEDURE — 84439 ASSAY OF FREE THYROXINE: CPT

## 2023-02-23 PROCEDURE — 83001 ASSAY OF GONADOTROPIN (FSH): CPT

## 2023-02-23 PROCEDURE — 84443 ASSAY THYROID STIM HORMONE: CPT

## 2023-02-23 PROCEDURE — 80053 COMPREHEN METABOLIC PANEL: CPT

## 2023-02-23 PROCEDURE — 86803 HEPATITIS C AB TEST: CPT

## 2023-02-23 PROCEDURE — 84144 ASSAY OF PROGESTERONE: CPT

## 2023-02-23 PROCEDURE — 83002 ASSAY OF GONADOTROPIN (LH): CPT

## 2023-02-23 PROCEDURE — 83036 HEMOGLOBIN GLYCOSYLATED A1C: CPT

## 2023-02-23 PROCEDURE — 85025 COMPLETE CBC W/AUTO DIFF WBC: CPT

## 2023-02-23 PROCEDURE — 87389 HIV-1 AG W/HIV-1&-2 AB AG IA: CPT

## 2023-02-23 PROCEDURE — 86376 MICROSOMAL ANTIBODY EACH: CPT

## 2023-02-23 PROCEDURE — 36415 COLL VENOUS BLD VENIPUNCTURE: CPT

## 2023-02-23 PROCEDURE — 84481 FREE ASSAY (FT-3): CPT

## 2023-02-23 PROCEDURE — 80061 LIPID PANEL: CPT

## 2023-02-23 NOTE — PROGRESS NOTES
Please inform patient of results.    1. Spot glucose is 280 and a1c was worse than previous.  Was 8.9.  get back on trulicity as discussed. Monitor cbgs. Follow dmii diet.  2. TC and TG are elevated and not at goal. Work on diet/exercise. monitor  3. tsh is wnl.  Other thyroid fx pending    Other labwork pending

## 2023-02-23 NOTE — PROGRESS NOTES
Please inform patient of results.    1. Ua shows glucose.  To be expected since has been off trulicity.  Get back on meds as discussed at appt. Encourage fluids.  Contact clinic for concerns

## 2023-02-24 ENCOUNTER — PATIENT MESSAGE (OUTPATIENT)
Dept: FAMILY MEDICINE | Facility: CLINIC | Age: 48
End: 2023-02-24
Payer: COMMERCIAL

## 2023-02-24 NOTE — PROGRESS NOTES
Please inform patient of results.    1. Hormones are wnl in the luteal phase.    2. Free t3 and free t4 are wnl  3. Tpo is pending. Will call with results when available    Other labwork within acceptable ranges.

## 2023-02-25 LAB
THYROID PEROXIDASE (OLG): NEGATIVE
THYROID PEROXIDASE QUANT (OLG): <4 IU/ML

## 2023-02-26 NOTE — PROGRESS NOTES
Please inform patient of results.    1. TPO thyroid antibodies are negative/normal    Other labwork within acceptable ranges.

## 2023-02-27 ENCOUNTER — PATIENT MESSAGE (OUTPATIENT)
Dept: FAMILY MEDICINE | Facility: CLINIC | Age: 48
End: 2023-02-27
Payer: COMMERCIAL

## 2023-02-27 ENCOUNTER — TELEPHONE (OUTPATIENT)
Dept: FAMILY MEDICINE | Facility: CLINIC | Age: 48
End: 2023-02-27
Payer: COMMERCIAL

## 2023-02-27 NOTE — TELEPHONE ENCOUNTER
Patient's urine creatinine level was high, she would like your feedback on it. She has been discussing with me in the portal, so a portal reply is ok.

## 2023-02-27 NOTE — TELEPHONE ENCOUNTER
----- Message from Kayla Petersen MD sent at 2/23/2023 12:43 PM CST -----  Please inform patient of results.    1. Spot glucose is 280 and a1c was worse than previous.  Was 8.9.  get back on trulicity as discussed. Monitor cbgs. Follow dmii diet.  2. TC and TG are elevated and not at goal. Work on diet/exercise. monitor  3. tsh is wnl.  Other thyroid fx pending    Other labwork pending

## 2023-03-02 ENCOUNTER — PATIENT MESSAGE (OUTPATIENT)
Dept: ADMINISTRATIVE | Facility: HOSPITAL | Age: 48
End: 2023-03-02
Payer: COMMERCIAL

## 2023-03-15 ENCOUNTER — HOSPITAL ENCOUNTER (OUTPATIENT)
Dept: RADIOLOGY | Facility: HOSPITAL | Age: 48
Discharge: HOME | End: 2023-03-15
Attending: FAMILY MEDICINE
Payer: COMMERCIAL

## 2023-03-15 DIAGNOSIS — Z12.31 BREAST CANCER SCREENING BY MAMMOGRAM: ICD-10-CM

## 2023-03-15 PROCEDURE — 77067 SCR MAMMO BI INCL CAD: CPT | Mod: 26,,, | Performed by: STUDENT IN AN ORGANIZED HEALTH CARE EDUCATION/TRAINING PROGRAM

## 2023-03-15 PROCEDURE — 77067 MAMMO DIGITAL SCREENING BILAT WITH TOMO: ICD-10-PCS | Mod: 26,,, | Performed by: STUDENT IN AN ORGANIZED HEALTH CARE EDUCATION/TRAINING PROGRAM

## 2023-03-15 PROCEDURE — 77063 MAMMO DIGITAL SCREENING BILAT WITH TOMO: ICD-10-PCS | Mod: 26,,, | Performed by: STUDENT IN AN ORGANIZED HEALTH CARE EDUCATION/TRAINING PROGRAM

## 2023-03-15 PROCEDURE — 77067 SCR MAMMO BI INCL CAD: CPT | Mod: TC

## 2023-03-15 PROCEDURE — 77063 BREAST TOMOSYNTHESIS BI: CPT | Mod: 26,,, | Performed by: STUDENT IN AN ORGANIZED HEALTH CARE EDUCATION/TRAINING PROGRAM

## 2023-03-16 ENCOUNTER — PATIENT MESSAGE (OUTPATIENT)
Dept: ADMINISTRATIVE | Facility: HOSPITAL | Age: 48
End: 2023-03-16
Payer: COMMERCIAL

## 2023-03-16 ENCOUNTER — PATIENT MESSAGE (OUTPATIENT)
Dept: FAMILY MEDICINE | Facility: CLINIC | Age: 48
End: 2023-03-16
Payer: COMMERCIAL

## 2023-03-16 RX ORDER — OXCARBAZEPINE 600 MG/1
600 TABLET, FILM COATED ORAL DAILY
Qty: 90 TABLET | Refills: 3 | Status: SHIPPED | OUTPATIENT
Start: 2023-03-16 | End: 2023-03-17 | Stop reason: SDUPTHER

## 2023-03-17 ENCOUNTER — TELEPHONE (OUTPATIENT)
Dept: FAMILY MEDICINE | Facility: CLINIC | Age: 48
End: 2023-03-17
Payer: COMMERCIAL

## 2023-03-17 DIAGNOSIS — R92.8 ABNORMAL MAMMOGRAM: Primary | ICD-10-CM

## 2023-03-17 RX ORDER — OXCARBAZEPINE 600 MG/1
600 TABLET, FILM COATED ORAL DAILY
Qty: 90 TABLET | Refills: 3 | Status: SHIPPED | OUTPATIENT
Start: 2023-03-17 | End: 2023-12-05 | Stop reason: SDUPTHER

## 2023-03-17 NOTE — TELEPHONE ENCOUNTER
----- Message from Shauna Lin LPN sent at 3/17/2023  8:43 AM CDT -----  Regarding: FW: call back  We sent two scripts in error. One reads take once daily, the other reads twice daily. Please advise on correct directions and I will call to clarify.   ----- Message -----  From: Roslyn Patino  Sent: 3/17/2023   8:25 AM CDT  To: [facility]  Subject: call back                                        .Type:  Pharmacy Calling to Clarify an RX    Name of Caller: sonal arias)  Pharmacy Name: [facility]  Prescription Name:OXcarbazepine (TRILEPTAL  What do they need to clarify?: pharmacist it's two different instructions on script  Best Call Back Number:[phone]  Additional Information: pls call back pharmacy

## 2023-03-17 NOTE — TELEPHONE ENCOUNTER
I looked at her last note and it is listed as once daily so I re sent in once daily dosing rx.        I have signed for the following orders AND/OR meds.  Please call the patient and ask the patient to schedule the testing AND/OR inform about any medications that were sent.        Medications Ordered This Encounter   Medications    OXcarbazepine (TRILEPTAL) 600 MG Tab     Sig: Take 1 tablet (600 mg total) by mouth once daily.     Dispense:  90 tablet     Refill:  3

## 2023-03-17 NOTE — PROGRESS NOTES
Patient had abnormal MMG. Please ensure she has been scheduled for further evaluation by the breast center to prevent a delay in care.

## 2023-03-27 ENCOUNTER — TELEPHONE (OUTPATIENT)
Dept: FAMILY MEDICINE | Facility: CLINIC | Age: 48
End: 2023-03-27
Payer: COMMERCIAL

## 2023-03-27 RX ORDER — ONDANSETRON 4 MG/1
4 TABLET, FILM COATED ORAL 2 TIMES DAILY
Qty: 15 TABLET | Refills: 1 | Status: SHIPPED | OUTPATIENT
Start: 2023-03-27 | End: 2023-04-24

## 2023-03-27 NOTE — TELEPHONE ENCOUNTER
"Patient said for the last 2 weeks she has had random bouts of nausea with no vomiting. She drinks miralax in her coffee every morning and had not had a BM in a few days so she started taking a stool softener which did help her had a BM but she had diarrhea. She also had c/o belching that "tasted like stale beer" as she described. She contacted [facility] who told her she did not need a referral to make an appt. She is asking for a script for zofran to take when needed until her appt with [facility] gastro.   "

## 2023-03-27 NOTE — TELEPHONE ENCOUNTER
----- Message from Emelia Jordan sent at 3/27/2023  8:34 AM CDT -----  Regarding: med advice  .Type:  Needs Medical Advice    Who Called: Pt  Symptoms (please be specific): nausea, stomach pain   How long has patient had these symptoms:    Pharmacy name and phone #:    Would the patient rather a call back or a response via [facility]? Call back  Best Call Back Number: [phone]  Additional Information: Pt declined appt, wants nurse to f/u

## 2023-03-27 NOTE — TELEPHONE ENCOUNTER
I have signed for the following orders AND/OR meds.  Please call the patient and ask the patient to schedule the testing AND/OR inform about any medications that were sent.      Medications Ordered This Encounter   Medications    ondansetron (ZOFRAN) 4 MG tablet     Sig: Take 1 tablet (4 mg total) by mouth 2 (two) times daily. As needed for nausea     Dispense:  15 tablet     Refill:  1

## 2023-04-05 ENCOUNTER — HOSPITAL ENCOUNTER (OUTPATIENT)
Dept: RADIOLOGY | Facility: HOSPITAL | Age: 48
Discharge: HOME | End: 2023-04-05
Attending: FAMILY MEDICINE
Payer: COMMERCIAL

## 2023-04-05 ENCOUNTER — PATIENT MESSAGE (OUTPATIENT)
Dept: FAMILY MEDICINE | Facility: CLINIC | Age: 48
End: 2023-04-05
Payer: COMMERCIAL

## 2023-04-05 DIAGNOSIS — R92.8 ABNORMAL MAMMOGRAM: ICD-10-CM

## 2023-04-05 DIAGNOSIS — E11.65 TYPE 2 DIABETES MELLITUS WITH HYPERGLYCEMIA, WITHOUT LONG-TERM CURRENT USE OF INSULIN: ICD-10-CM

## 2023-04-05 PROCEDURE — 76642 US BREAST LEFT LIMITED: ICD-10-PCS | Mod: 26,LT,, | Performed by: STUDENT IN AN ORGANIZED HEALTH CARE EDUCATION/TRAINING PROGRAM

## 2023-04-05 PROCEDURE — 76642 ULTRASOUND BREAST LIMITED: CPT | Mod: TC,LT

## 2023-04-05 PROCEDURE — 77061 BREAST TOMOSYNTHESIS UNI: CPT | Mod: 26,LT,, | Performed by: STUDENT IN AN ORGANIZED HEALTH CARE EDUCATION/TRAINING PROGRAM

## 2023-04-05 PROCEDURE — 77061 MAMMO DIGITAL DIAGNOSTIC LEFT WITH TOMO: ICD-10-PCS | Mod: 26,LT,, | Performed by: STUDENT IN AN ORGANIZED HEALTH CARE EDUCATION/TRAINING PROGRAM

## 2023-04-05 PROCEDURE — 77061 BREAST TOMOSYNTHESIS UNI: CPT | Mod: TC,LT

## 2023-04-05 PROCEDURE — 76642 ULTRASOUND BREAST LIMITED: CPT | Mod: 26,LT,, | Performed by: STUDENT IN AN ORGANIZED HEALTH CARE EDUCATION/TRAINING PROGRAM

## 2023-04-05 PROCEDURE — 77065 MAMMO DIGITAL DIAGNOSTIC LEFT WITH TOMO: ICD-10-PCS | Mod: 26,LT,, | Performed by: STUDENT IN AN ORGANIZED HEALTH CARE EDUCATION/TRAINING PROGRAM

## 2023-04-05 PROCEDURE — 77065 DX MAMMO INCL CAD UNI: CPT | Mod: 26,LT,, | Performed by: STUDENT IN AN ORGANIZED HEALTH CARE EDUCATION/TRAINING PROGRAM

## 2023-04-05 RX ORDER — DULAGLUTIDE 1.5 MG/.5ML
1.5 INJECTION, SOLUTION SUBCUTANEOUS
Qty: 4 PEN | Refills: 11 | Status: SHIPPED | OUTPATIENT
Start: 2023-04-05 | End: 2023-08-01 | Stop reason: SDUPTHER

## 2023-04-05 NOTE — TELEPHONE ENCOUNTER
I have signed for the following orders AND/OR meds.  Please call the patient and ask the patient to schedule the testing AND/OR inform about any medications that were sent.        Medications Ordered This Encounter   Medications    dulaglutide (TRULICITY) 1.5 mg/0.5 mL pen injector     Sig: Inject 1.5 mg into the skin every 7 days.     Dispense:  4 pen     Refill:  11

## 2023-04-17 ENCOUNTER — PATIENT MESSAGE (OUTPATIENT)
Dept: ADMINISTRATIVE | Facility: HOSPITAL | Age: 48
End: 2023-04-17
Payer: COMMERCIAL

## 2023-05-01 ENCOUNTER — PATIENT MESSAGE (OUTPATIENT)
Dept: ADMINISTRATIVE | Facility: HOSPITAL | Age: 48
End: 2023-05-01
Payer: COMMERCIAL

## 2023-05-04 ENCOUNTER — OFFICE VISIT (OUTPATIENT)
Dept: URGENT CARE | Facility: CLINIC | Age: 48
End: 2023-05-04
Payer: COMMERCIAL

## 2023-05-04 VITALS
TEMPERATURE: 98 F | HEIGHT: 65 IN | HEART RATE: 89 BPM | BODY MASS INDEX: 36.65 KG/M2 | WEIGHT: 220 LBS | SYSTOLIC BLOOD PRESSURE: 135 MMHG | RESPIRATION RATE: 18 BRPM | DIASTOLIC BLOOD PRESSURE: 89 MMHG | OXYGEN SATURATION: 97 %

## 2023-05-04 DIAGNOSIS — K58.9 IRRITABLE BOWEL SYNDROME, UNSPECIFIED TYPE: Primary | ICD-10-CM

## 2023-05-04 PROCEDURE — 99214 OFFICE O/P EST MOD 30 MIN: CPT | Mod: S$PBB,,, | Performed by: FAMILY MEDICINE

## 2023-05-04 PROCEDURE — 99214 PR OFFICE/OUTPT VISIT, EST, LEVL IV, 30-39 MIN: ICD-10-PCS | Mod: S$PBB,,, | Performed by: FAMILY MEDICINE

## 2023-05-04 PROCEDURE — 99215 OFFICE O/P EST HI 40 MIN: CPT | Mod: PBBFAC | Performed by: FAMILY MEDICINE

## 2023-05-04 RX ORDER — PANTOPRAZOLE SODIUM 40 MG/1
TABLET, DELAYED RELEASE ORAL
COMMUNITY
Start: 2023-05-03 | End: 2023-09-05 | Stop reason: SDUPTHER

## 2023-05-04 RX ORDER — LUBIPROSTONE 24 UG/1
CAPSULE, GELATIN COATED ORAL
COMMUNITY
Start: 2023-05-03 | End: 2023-12-05

## 2023-05-04 NOTE — LETTER
May 4, 2023      [facility]  [address] [address]  Phone: [phone]       Patient: Katharine Scott   YOB: 1975  Date of Visit: 05/04/2023    To Whom It May Concern:    Armida Scott  was at [facility] on 05/04/2023. The patient may return to work/school on 5/6/2023 with no restrictions. If you have any questions or concerns, or if I can be of further assistance, please do not hesitate to contact me.    Sincerely,    NADEEN Carlton MD

## 2023-05-04 NOTE — PROGRESS NOTES
"Subjective:       Patient ID: Katharine Scott is a 48 y.o. female.    Chief Complaint: excessive gas, Dizziness, and Nausea      Dizziness:    Associated symptoms: nausea.  Nausea  Associated symptoms include nausea.   48-year-old female with excessive gas, dizziness, and nausea for the last 1 week.  She has a past medical history of irritable bowel constipation type.  States that this feels similar to exacerbations she is had in the past.  She was put on Amitiza approximately 1 month ago.  It has not helped her symptoms.  She would like to try something different.  Review of Systems   Gastrointestinal:  Positive for nausea.   Neurological:  Positive for dizziness.       Objective:       Vital Signs  Temp: 98.3 °F (36.8 °C)  Temp Source: Oral  Pulse: 89  Resp: 18  SpO2: 97 %  BP: 135/89  Height and Weight  Height: 5' 5" (165.1 cm)  Weight: 99.8 kg (220 lb)  BSA (Calculated - sq m): 2.14 sq meters  BMI (Calculated): 36.6  Weight in (lb) to have BMI = 25: 149.9]  Physical Exam  Vitals reviewed.   Constitutional:       Appearance: Normal appearance.   HENT:      Head: Normocephalic and atraumatic.   Eyes:      Extraocular Movements: Extraocular movements intact.      Conjunctiva/sclera: Conjunctivae normal.   Cardiovascular:      Rate and Rhythm: Normal rate and regular rhythm.      Heart sounds: Normal heart sounds.   Pulmonary:      Breath sounds: Normal breath sounds.   Skin:     General: Skin is warm and dry.   Neurological:      General: No focal deficit present.      Mental Status: She is alert.   Psychiatric:         Mood and Affect: Mood and affect normal.         Speech: Speech normal.         Behavior: Behavior normal. Behavior is cooperative.         Thought Content: Thought content does not include homicidal or suicidal ideation.       Assessment:       Problem List Items Addressed This Visit    None  Visit Diagnoses       Irritable bowel syndrome, unspecified type    -  Primary    Relevant Medications "    linaCLOtide (LINZESS) 290 mcg Cap capsule            Plan:   Encouraged increased fluids and fiber intake   ER precautions  Follow-up with PCP

## 2023-05-11 RX ORDER — FENOFIBRATE 48 MG/1
TABLET, FILM COATED ORAL
Qty: 30 TABLET | Refills: 11 | Status: SHIPPED | OUTPATIENT
Start: 2023-05-11 | End: 2023-12-05 | Stop reason: SDUPTHER

## 2023-05-18 ENCOUNTER — TELEPHONE (OUTPATIENT)
Dept: FAMILY MEDICINE | Facility: CLINIC | Age: 48
End: 2023-05-18
Payer: COMMERCIAL

## 2023-05-18 NOTE — TELEPHONE ENCOUNTER
Patient was unaware that she had an appt for med refills yesterday so she cancelled it. She is not able to schedule a new appt until approx two weeks from now due to work. She is asking if she can still get her refills, or enough to last until her next appt?      I also advised her that she can schedule an appt via the karis or call the schedule she verbalized understanding

## 2023-05-18 NOTE — TELEPHONE ENCOUNTER
Which 2 medications did she need?  I will be out of office the last week of may so she will need to try to be seen before then or be seen after.

## 2023-05-18 NOTE — TELEPHONE ENCOUNTER
----- Message from April Stewart sent at 5/18/2023  9:36 AM CDT -----  Regarding: med advice  .Type:  Needs Medical Advice    Who Called:  patient  Symptoms (please be specific):    How long has patient had these symptoms:    Pharmacy name and phone #:    Would the patient rather a call back or a response via [facility]? Call back  Best Call Back Number: [phone]  Additional Information:  patient would like to be contacted back regarding two medications.

## 2023-05-19 ENCOUNTER — PATIENT MESSAGE (OUTPATIENT)
Dept: FAMILY MEDICINE | Facility: CLINIC | Age: 48
End: 2023-05-19
Payer: COMMERCIAL

## 2023-05-19 DIAGNOSIS — F90.9 ATTENTION DEFICIT HYPERACTIVITY DISORDER (ADHD), UNSPECIFIED ADHD TYPE: ICD-10-CM

## 2023-05-19 RX ORDER — DEXTROAMPHETAMINE SACCHARATE, AMPHETAMINE ASPARTATE, DEXTROAMPHETAMINE SULFATE AND AMPHETAMINE SULFATE 7.5; 7.5; 7.5; 7.5 MG/1; MG/1; MG/1; MG/1
30 TABLET ORAL EVERY MORNING
Qty: 30 TABLET | Refills: 0 | OUTPATIENT
Start: 2023-05-19 | End: 2023-06-18

## 2023-05-19 RX ORDER — DEXTROAMPHETAMINE SACCHARATE, AMPHETAMINE ASPARTATE, DEXTROAMPHETAMINE SULFATE AND AMPHETAMINE SULFATE 5; 5; 5; 5 MG/1; MG/1; MG/1; MG/1
TABLET ORAL
Qty: 45 TABLET | Refills: 0 | OUTPATIENT
Start: 2023-05-19

## 2023-05-19 NOTE — TELEPHONE ENCOUNTER
Ok to keep scheduled appt for Monday.    Garett reviewed.  Last filled adderall 30mg #30 on 4/28/23 and adderal 20mg #45 on 4/28/23 so she should not be due for refills until 5/28/23.  We should be able to fill for her at her appt on Monday correct???

## 2023-05-22 ENCOUNTER — OFFICE VISIT (OUTPATIENT)
Dept: FAMILY MEDICINE | Facility: CLINIC | Age: 48
End: 2023-05-22
Payer: COMMERCIAL

## 2023-05-22 ENCOUNTER — TELEPHONE (OUTPATIENT)
Dept: FAMILY MEDICINE | Facility: CLINIC | Age: 48
End: 2023-05-22

## 2023-05-22 DIAGNOSIS — E03.9 HYPOTHYROIDISM, UNSPECIFIED TYPE: ICD-10-CM

## 2023-05-22 DIAGNOSIS — E11.65 TYPE 2 DIABETES MELLITUS WITH HYPERGLYCEMIA, WITHOUT LONG-TERM CURRENT USE OF INSULIN: ICD-10-CM

## 2023-05-22 DIAGNOSIS — F90.9 ATTENTION DEFICIT HYPERACTIVITY DISORDER (ADHD), UNSPECIFIED ADHD TYPE: Primary | ICD-10-CM

## 2023-05-22 DIAGNOSIS — K59.00 CONSTIPATION, UNSPECIFIED CONSTIPATION TYPE: ICD-10-CM

## 2023-05-22 DIAGNOSIS — F31.9 BIPOLAR AFFECTIVE DISORDER, REMISSION STATUS UNSPECIFIED: ICD-10-CM

## 2023-05-22 DIAGNOSIS — R92.8 ABNORMAL MAMMOGRAM: ICD-10-CM

## 2023-05-22 PROCEDURE — 99214 OFFICE O/P EST MOD 30 MIN: CPT | Mod: 95,,, | Performed by: FAMILY MEDICINE

## 2023-05-22 PROCEDURE — 99214 PR OFFICE/OUTPT VISIT, EST, LEVL IV, 30-39 MIN: ICD-10-PCS | Mod: 95,,, | Performed by: FAMILY MEDICINE

## 2023-05-22 RX ORDER — DEXTROAMPHETAMINE SACCHARATE, AMPHETAMINE ASPARTATE, DEXTROAMPHETAMINE SULFATE AND AMPHETAMINE SULFATE 7.5; 7.5; 7.5; 7.5 MG/1; MG/1; MG/1; MG/1
30 TABLET ORAL EVERY MORNING
Qty: 30 TABLET | Refills: 0 | Status: SHIPPED | OUTPATIENT
Start: 2023-05-28 | End: 2023-05-23 | Stop reason: SDUPTHER

## 2023-05-22 RX ORDER — DEXTROAMPHETAMINE SACCHARATE, AMPHETAMINE ASPARTATE, DEXTROAMPHETAMINE SULFATE AND AMPHETAMINE SULFATE 7.5; 7.5; 7.5; 7.5 MG/1; MG/1; MG/1; MG/1
30 TABLET ORAL EVERY MORNING
Qty: 30 TABLET | Refills: 0 | Status: SHIPPED | OUTPATIENT
Start: 2023-06-27 | End: 2023-06-27

## 2023-05-22 RX ORDER — DEXTROAMPHETAMINE SACCHARATE, AMPHETAMINE ASPARTATE, DEXTROAMPHETAMINE SULFATE AND AMPHETAMINE SULFATE 5; 5; 5; 5 MG/1; MG/1; MG/1; MG/1
TABLET ORAL
Qty: 45 TABLET | Refills: 0 | Status: SHIPPED | OUTPATIENT
Start: 2023-07-27 | End: 2023-06-28

## 2023-05-22 RX ORDER — DEXTROAMPHETAMINE SACCHARATE, AMPHETAMINE ASPARTATE, DEXTROAMPHETAMINE SULFATE AND AMPHETAMINE SULFATE 5; 5; 5; 5 MG/1; MG/1; MG/1; MG/1
TABLET ORAL
Qty: 45 TABLET | Refills: 0 | Status: SHIPPED | OUTPATIENT
Start: 2023-06-27 | End: 2023-06-28

## 2023-05-22 RX ORDER — TRAZODONE HYDROCHLORIDE 100 MG/1
200 TABLET ORAL NIGHTLY
Qty: 180 TABLET | Refills: 3 | Status: SHIPPED | OUTPATIENT
Start: 2023-05-22 | End: 2023-12-05 | Stop reason: SDUPTHER

## 2023-05-22 RX ORDER — DEXTROAMPHETAMINE SACCHARATE, AMPHETAMINE ASPARTATE, DEXTROAMPHETAMINE SULFATE AND AMPHETAMINE SULFATE 5; 5; 5; 5 MG/1; MG/1; MG/1; MG/1
TABLET ORAL
Qty: 45 TABLET | Refills: 0 | Status: SHIPPED | OUTPATIENT
Start: 2023-05-28 | End: 2023-05-23 | Stop reason: SDUPTHER

## 2023-05-22 RX ORDER — DEXTROAMPHETAMINE SACCHARATE, AMPHETAMINE ASPARTATE, DEXTROAMPHETAMINE SULFATE AND AMPHETAMINE SULFATE 7.5; 7.5; 7.5; 7.5 MG/1; MG/1; MG/1; MG/1
30 TABLET ORAL EVERY MORNING
Qty: 30 TABLET | Refills: 0 | Status: SHIPPED | OUTPATIENT
Start: 2023-07-27 | End: 2023-06-27

## 2023-05-22 NOTE — ASSESSMENT & PLAN NOTE
on Adderall 40 mg (30+10) in the 6 morning and Adderall 10 mg at 2pm    [employer] reviewed. Controlled substance policy signed 2/2023. Postdated refills sent in as requested. Reminded patient this is a controlled medication and must be seen q3 months while on.  Patient is agreeable to plan and verbalized understanding

## 2023-05-22 NOTE — TELEPHONE ENCOUNTER
Patient is on trulicity 1.5mg.  has lost insurance. Can we reach out to rep to see if we can get some samples until her insurance kicks in?

## 2023-05-22 NOTE — ASSESSMENT & PLAN NOTE
Lab Results   Component Value Date    HGBA1C 8.9 (H) 02/23/2023       Urine micro 2/2023  Foot exam 2/2023  Eye exam at [facility]      On glipizide and statin. Reports concern for elevated sugars. On trulicity 1.5mg weekly.  Has 2 shots left. Asking for samples since she does not have insurance currently. We do not have samples in office. Will reach out to rep to see if can get some for patient.  Take as directed. Monitor cbgs closely. Contact clinic for concerns.

## 2023-05-22 NOTE — PROGRESS NOTES
Subjective:        Patient ID: Katharine Scott is a 48 y.o. female.    Chief Complaint: Follow-up (ADHD follow up )       Patient presents via telemed for adhd follow up. She is due for her wellness visit in february     She has ADHD and is on Adderall 40 mg (30+10) in the 6 morning and Adderall 10 mg at 2pm. needs postdated refills of this. At new job.  Liking so far.      The patient also has diabetes and thyroid disease(hypothyroidism) which was followed by Dr. Rodriguez. has not seen him since lost insurance. was on radioactive iodine. on synthroid 175mcg daily. tsh 6/2021 wnl. C/o hair and face oily. Noted hair growth on chin.       She has diabetes.  when she is on trulicity- cbgs 127 average. she is prescribed trulicity 1.5mg weekly and glipizide 10mg (20mg BID). Cbgs in 400s. Following dmii diet. She went to [facility] for her eye exam on 2/2020- a copy is in the chart. a1c 6.3 3/2021. Foot exam today. Reports blurry vision.     She has hyperlipidemia and is compliant with her medications. On fenofibrate     She has bipolar/depression and saw psychiatry, Dr. Holly Camarena in the past. on wellbutrin and oxcarbazepine and effexor in past.  Currently on trileptal and trazodone.  Took lexapro- made wired and could not sleep.  Stopped.       Has seasonal allergies and is on dymista.    She has constipation. She is not on linzess. She is taking amitiza.      Mammogram 3/2023 was abnormal.  Dx mmg and us 4/2023 recommended biopsy. She does not have insurance so will be waiting to get biopsy until insurance kicks in in 90 days from 5/15/2023.   Has not had colon cancer screening. We will order cologuard. Lmp end of 1/2023. Cycles regular.  Declines pap today. Will plan on at next appt.      She is ALLERGIC to aspirin and metformin. She does not drink alcohol or smoke. declines tdap and Shingrix. completed covid series and booster.                The patient location is: adhd  The chief complaint leading to  consultation is: car/work    Visit type: audiovisual    Face to Face time with patient: 20 minutes  25 minutes of total time spent on the encounter, which includes face to face time and non-face to face time preparing to see the patient (eg, review of tests), Obtaining and/or reviewing separately obtained history, Documenting clinical information in the electronic or other health record, Independently interpreting results (not separately reported) and communicating results to the patient/family/caregiver, or Care coordination (not separately reported).         Each patient to whom he or she provides medical services by telemedicine is:  (1) informed of the relationship between the physician and patient and the respective role of any other health care provider with respect to management of the patient; and (2) notified that he or she may decline to receive medical services by telemedicine and may withdraw from such care at any time.    Notes:         Review of Systems   Constitutional: Negative.    HENT: Negative.     Respiratory: Negative.     Cardiovascular: Negative.    Gastrointestinal: Negative.    Genitourinary: Negative.        Review of patient's allergies indicates:   Allergen Reactions    Aspirin     Metformin      Other reaction(s): makes ill    Morphine     Penicillin Rash      There were no vitals filed for this visit.   Social History     Socioeconomic History    Marital status:    Occupational History    Occupation: pharmacy   Tobacco Use    Smoking status: Never    Smokeless tobacco: Never   Substance and Sexual Activity    Alcohol use: Not Currently    Drug use: Never      History reviewed. No pertinent family history.       Objective:     Physical Exam  Vitals and nursing note reviewed.   Constitutional:       Appearance: Normal appearance. She is obese.   HENT:      Head: Normocephalic and atraumatic.      Nose: Nose normal.   Pulmonary:      Effort: Pulmonary effort is normal.    Neurological:      General: No focal deficit present.      Mental Status: She is alert and oriented to person, place, and time. Mental status is at baseline.   Psychiatric:         Mood and Affect: Mood normal.     Current Outpatient Medications on File Prior to Visit   Medication Sig Dispense Refill    AMITIZA 24 mcg Cap TAKE ONE CAPSULE ONCE DAILY      dulaglutide (TRULICITY) 1.5 mg/0.5 mL pen injector Inject 1.5 mg into the skin every 7 days. 4 pen 11    fenofibrate (TRICOR) 48 MG tablet TAKE ONE TABLET BY MOUTH ONCE EVERY DAY WE OWE HER FROM 23-GIVE 2 MORE ON NEXT FILL 30 tablet 11    glipiZIDE (GLUCOTROL) 10 MG tablet TAKE 2 TABLETS BY MOUTH TWICE DAILY 120 tablet 3    levothyroxine (SYNTHROID, LEVOTHROID) 175 MCG tablet TAKE ONE TABLET BY MOUTH DAILY 30 tablet 5    ondansetron (ZOFRAN) 4 MG tablet TAKE 1 TABLET (4 MG TOTAL) BY MOUTH 2 (TWO) TIMES DAILY. AS NEEDED FOR NAUSEA 15 tablet 1    OXcarbazepine (TRILEPTAL) 600 MG Tab Take 1 tablet (600 mg total) by mouth once daily. 90 tablet 3    pantoprazole (PROTONIX) 40 MG tablet SMARTSI Tablet(s) By Mouth Morning-Evening      [DISCONTINUED] azelastine-fluticasone (DYMISTA) 137-50 mcg/spray Spry nassal spray 1 spray by Each Nostril route 2 (two) times daily. (Patient not taking: Reported on 2023) 23 g 11    [DISCONTINUED] dextroamphetamine-amphetamine (ADDERALL) 20 mg tablet TAKE one tablet by mouth in the a.m. and  1/2 tablet AT 2PM 45 tablet 0    [DISCONTINUED] dextroamphetamine-amphetamine (ADDERALL) 20 mg tablet TAKE one tablet by mouth in the a.m. and  1/2 tablet AT 2PM 45 tablet 0    [DISCONTINUED] dextroamphetamine-amphetamine (ADDERALL) 20 mg tablet TAKE one tablet by mouth in the a.m. and  1/2 tablet AT 2PM 45 tablet 0    [DISCONTINUED] dextroamphetamine-amphetamine (ADDERALL) 20 mg tablet TAKE one tablet by mouth in the a.m. and  1/2 tablet AT 2PM 45 tablet 0    [DISCONTINUED] dextroamphetamine-amphetamine (ADDERALL) 20 mg tablet TAKE one  tablet by mouth in the a.m. and  1/2 tablet AT 2PM 45 tablet 0    [DISCONTINUED] dextroamphetamine-amphetamine 30 mg Tab Take 1 tablet (30 mg total) by mouth every morning. 30 tablet 0    [DISCONTINUED] dextroamphetamine-amphetamine 30 mg Tab Take 1 tablet (30 mg total) by mouth every morning. 30 tablet 0    [DISCONTINUED] dextroamphetamine-amphetamine 30 mg Tab Take 1 tablet (30 mg total) by mouth every morning. 30 tablet 0    [DISCONTINUED] linaCLOtide (LINZESS) 290 mcg Cap capsule Take 1 capsule (290 mcg total) by mouth before breakfast. 30 capsule 2    [DISCONTINUED] traZODone (DESYREL) 100 MG tablet Take 2 tablets (200 mg total) by mouth every evening. 180 tablet 3    [DISCONTINUED] traZODone (DESYREL) 100 MG tablet TAKE 2 TABLETS BY MOUTH ONCE DAILY AT BEDTIME 60 tablet 0     No current facility-administered medications on file prior to visit.     Health Maintenance   Topic Date Due    Low Dose Statin  Never done    Eye Exam  04/30/2020    Hemoglobin A1c  05/23/2023    TETANUS VACCINE  02/22/2024 (Originally 3/3/1995)    Foot Exam  02/22/2024    Lipid Panel  02/23/2024    Mammogram  04/05/2024    Hepatitis C Screening  Completed      Results for orders placed or performed in visit on 02/23/23   Comprehensive Metabolic Panel   Result Value Ref Range    Sodium Level 135 (L) 136 - 145 mmol/L    Potassium Level 4.1 3.5 - 5.1 mmol/L    Chloride 104 98 - 107 mmol/L    Carbon Dioxide 20 (L) 22 - 29 mmol/L    Glucose Level 280 (H) 74 - 100 mg/dL    Blood Urea Nitrogen 8.7 7.0 - 18.7 mg/dL    Creatinine 0.80 0.55 - 1.02 mg/dL    Calcium Level Total 9.2 8.4 - 10.2 mg/dL    Protein Total 7.2 6.4 - 8.3 gm/dL    Albumin Level 3.7 3.5 - 5.0 g/dL    Globulin 3.5 2.4 - 3.5 gm/dL    Albumin/Globulin Ratio 1.1 1.1 - 2.0 ratio    Bilirubin Total 0.4 <=1.5 mg/dL    Alkaline Phosphatase 69 40 - 150 unit/L    Alanine Aminotransferase 12 0 - 55 unit/L    Aspartate Aminotransferase 11 5 - 34 unit/L    eGFR >60 mls/min/1.73/m2   Lipid  Panel   Result Value Ref Range    Cholesterol Total 212 (H) <=200 mg/dL    HDL Cholesterol 40 35 - 60 mg/dL    Triglyceride 178 (H) 37 - 140 mg/dL    Cholesterol/HDL Ratio 5 0 - 5    Very Low Density Lipoprotein 36     LDL Cholesterol 136.00 50.00 - 140.00 mg/dL   TSH   Result Value Ref Range    Thyroid Stimulating Hormone 0.548 0.350 - 4.940 uIU/mL   Hemoglobin A1C   Result Value Ref Range    Hemoglobin A1c 8.9 (H) <=7.0 %    Estimated Average Glucose 208.7 mg/dL   HIV 1/2 Ag/Ab (4th Gen)   Result Value Ref Range    HIV Nonreactive Nonreactive   Hepatitis C Antibody   Result Value Ref Range    Hepatitis C Antibody Nonreactive Nonreactive   T3, Free (OLG)   Result Value Ref Range    T3 Free 2.26 1.57 - 3.91 pg/mL   T4, Free   Result Value Ref Range    Thyroxine Free 1.15 0.70 - 1.48 ng/dL   THYROID PEROXIDASE (TPO)   Result Value Ref Range    Thyroid Peroxidase Qual Negative Negative    TPO Ab Quant <4 <=25 IU/mL   Estradiol   Result Value Ref Range    Estradiol Level 48 pg/mL   Progesterone   Result Value Ref Range    Progesterone Level 4.2 ng/mL   Luteinizing Hormone   Result Value Ref Range    Luteinizing Hormone 1.38 mIU/mL   Follicle Stimulating Hormone   Result Value Ref Range    Follicle Stimulating Hormone 3.24 mIU/mL   CBC with Differential   Result Value Ref Range    WBC 7.4 4.5 - 11.5 x10(3)/mcL    RBC 4.70 4.20 - 5.40 x10(6)/mcL    Hgb 14.2 12.0 - 16.0 g/dL    Hct 40.8 37.0 - 47.0 %    MCV 86.8 80.0 - 94.0 fL    MCH 30.2 pg    MCHC 34.8 33.0 - 36.0 g/dL    RDW 12.1 11.5 - 17.0 %    Platelet 310 130 - 400 x10(3)/mcL    MPV 9.8 7.4 - 10.4 fL    Neut % 65.4 %    Lymph % 26.8 %    Mono % 5.1 %    Eos % 1.9 %    Basophil % 0.7 %    Lymph # 1.99 0.6 - 4.6 x10(3)/mcL    Neut # 4.86 2.1 - 9.2 x10(3)/mcL    Mono # 0.38 0.1 - 1.3 x10(3)/mcL    Eos # 0.14 0 - 0.9 x10(3)/mcL    Baso # 0.05 0 - 0.2 x10(3)/mcL    IG# 0.01 0 - 0.04 x10(3)/mcL    IG% 0.1 %    NRBC% 0.0 %          Assessment & Plan:     Active Problem  List with Overview Notes    Diagnosis Date Noted    Constipation, unspecified 05/22/2023    Abnormal mammogram 05/22/2023    Wellness examination 02/22/2023    Breast cancer screening by mammogram 02/22/2023    Colon cancer screening 02/22/2023    Attention deficit hyperactivity disorder (ADHD) 08/02/2022    Bipolar disorder 08/02/2022    Diabetes mellitus 08/02/2022    Hyperlipidemia 08/02/2022    Hypothyroidism 08/02/2022    Obesity 08/02/2022    Tetanus, diphtheria, and acellular pertussis (Tdap) vaccination declined 08/02/2022       1. Attention deficit hyperactivity disorder (ADHD), unspecified ADHD type  Assessment & Plan:  on Adderall 40 mg (30+10) in the 6 morning and Adderall 10 mg at 2pm    [employer] reviewed. Controlled substance policy signed 2/2023. Postdated refills sent in as requested. Reminded patient this is a controlled medication and must be seen q3 months while on.  Patient is agreeable to plan and verbalized understanding        Orders:  -     dextroamphetamine-amphetamine 30 mg Tab; Take 1 tablet (30 mg total) by mouth every morning.  Dispense: 30 tablet; Refill: 0  -     dextroamphetamine-amphetamine 30 mg Tab; Take 1 tablet (30 mg total) by mouth every morning.  Dispense: 30 tablet; Refill: 0  -     dextroamphetamine-amphetamine 30 mg Tab; Take 1 tablet (30 mg total) by mouth every morning.  Dispense: 30 tablet; Refill: 0  -     dextroamphetamine-amphetamine (ADDERALL) 20 mg tablet; TAKE one tablet by mouth in the a.m. and  1/2 tablet AT 2PM  Dispense: 45 tablet; Refill: 0  -     dextroamphetamine-amphetamine (ADDERALL) 20 mg tablet; TAKE one tablet by mouth in the a.m. and  1/2 tablet AT 2PM  Dispense: 45 tablet; Refill: 0  -     dextroamphetamine-amphetamine (ADDERALL) 20 mg tablet; TAKE one tablet by mouth in the a.m. and  1/2 tablet AT 2PM  Dispense: 45 tablet; Refill: 0    2. Bipolar affective disorder, remission status unspecified  Assessment & Plan:  Stable on trileptal and trazodone.   Will contact clinic for concerns. Patient is agreeable to plan and verbalized understanding    Orders:  -     traZODone (DESYREL) 100 MG tablet; Take 2 tablets (200 mg total) by mouth every evening.  Dispense: 180 tablet; Refill: 3    3. Type 2 diabetes mellitus with hyperglycemia, without long-term current use of insulin  Assessment & Plan:  Lab Results   Component Value Date    HGBA1C 8.9 (H) 02/23/2023       Urine micro 2/2023  Foot exam 2/2023  Eye exam at [facility]      On glipizide and statin. Reports concern for elevated sugars. On trulicity 1.5mg weekly.  Has 2 shots left. Asking for samples since she does not have insurance currently. We do not have samples in office. Will reach out to rep to see if can get some for patient.  Take as directed. Monitor cbgs closely. Contact clinic for concerns.       4. Hypothyroidism, unspecified type  Assessment & Plan:  Lab Results   Component Value Date    TSH 0.548 02/23/2023       Stable on synthroid.       5. Constipation, unspecified constipation type  Assessment & Plan:  Doing well on amitiza      6. Abnormal mammogram  Assessment & Plan:  Pt had abm mmg 3/2023 and dx mmg and us 4/2023 recommended biopsy. She does not currently have insurance so has not scheduled biopsy.  Advised pt to schedule asap.            Follow up in about 3 months (around 8/22/2023) for ADHD virtual visit.

## 2023-05-22 NOTE — ASSESSMENT & PLAN NOTE
Pt had abm mmg 3/2023 and dx mmg and us 4/2023 recommended biopsy. She does not currently have insurance so has not scheduled biopsy.  Advised pt to schedule asap.

## 2023-05-23 ENCOUNTER — PATIENT MESSAGE (OUTPATIENT)
Dept: FAMILY MEDICINE | Facility: CLINIC | Age: 48
End: 2023-05-23
Payer: COMMERCIAL

## 2023-05-23 DIAGNOSIS — F90.9 ATTENTION DEFICIT HYPERACTIVITY DISORDER (ADHD), UNSPECIFIED ADHD TYPE: ICD-10-CM

## 2023-05-23 RX ORDER — DEXTROAMPHETAMINE SACCHARATE, AMPHETAMINE ASPARTATE, DEXTROAMPHETAMINE SULFATE AND AMPHETAMINE SULFATE 7.5; 7.5; 7.5; 7.5 MG/1; MG/1; MG/1; MG/1
30 TABLET ORAL EVERY MORNING
Qty: 30 TABLET | Refills: 0 | Status: SHIPPED | OUTPATIENT
Start: 2023-05-26 | End: 2023-06-25

## 2023-05-23 RX ORDER — DEXTROAMPHETAMINE SACCHARATE, AMPHETAMINE ASPARTATE, DEXTROAMPHETAMINE SULFATE AND AMPHETAMINE SULFATE 5; 5; 5; 5 MG/1; MG/1; MG/1; MG/1
TABLET ORAL
Qty: 45 TABLET | Refills: 0 | Status: SHIPPED | OUTPATIENT
Start: 2023-05-26 | End: 2023-06-28

## 2023-05-23 NOTE — TELEPHONE ENCOUNTER
Date changed as requested    I have signed for the following orders AND/OR meds.  Please call the patient and ask the patient to schedule the testing AND/OR inform about any medications that were sent.      Medications Ordered This Encounter   Medications    dextroamphetamine-amphetamine (ADDERALL) 20 mg tablet     Sig: TAKE one tablet by mouth in the a.m. and  1/2 tablet AT 2PM     Dispense:  45 tablet     Refill:  0    dextroamphetamine-amphetamine 30 mg Tab     Sig: Take 1 tablet (30 mg total) by mouth every morning.     Dispense:  30 tablet     Refill:  0

## 2023-05-25 ENCOUNTER — TELEPHONE (OUTPATIENT)
Dept: RADIOLOGY | Facility: HOSPITAL | Age: 48
End: 2023-05-25
Payer: COMMERCIAL

## 2023-05-29 PROBLEM — Z00.00 WELLNESS EXAMINATION: Status: RESOLVED | Noted: 2023-02-22 | Resolved: 2023-05-29

## 2023-06-07 ENCOUNTER — PATIENT MESSAGE (OUTPATIENT)
Dept: FAMILY MEDICINE | Facility: CLINIC | Age: 48
End: 2023-06-07
Payer: COMMERCIAL

## 2023-06-19 RX ORDER — LEVOTHYROXINE SODIUM 175 UG/1
TABLET ORAL
Qty: 30 TABLET | Refills: 5 | Status: SHIPPED | OUTPATIENT
Start: 2023-06-19 | End: 2023-12-05 | Stop reason: SDUPTHER

## 2023-06-26 ENCOUNTER — PATIENT MESSAGE (OUTPATIENT)
Dept: ADMINISTRATIVE | Facility: HOSPITAL | Age: 48
End: 2023-06-26
Payer: COMMERCIAL

## 2023-06-27 ENCOUNTER — PATIENT MESSAGE (OUTPATIENT)
Dept: FAMILY MEDICINE | Facility: CLINIC | Age: 48
End: 2023-06-27
Payer: COMMERCIAL

## 2023-06-27 NOTE — TELEPHONE ENCOUNTER
Since she is taking 40mg in am (was doing 30 +10) and adderall 10mg at 2pm would she like to do two 20mg to equal the 40mg in am?  Can we confirm that this is in stock before we send in rx?

## 2023-06-28 ENCOUNTER — PATIENT MESSAGE (OUTPATIENT)
Dept: FAMILY MEDICINE | Facility: CLINIC | Age: 48
End: 2023-06-28
Payer: COMMERCIAL

## 2023-06-28 DIAGNOSIS — F90.9 ATTENTION DEFICIT HYPERACTIVITY DISORDER (ADHD), UNSPECIFIED ADHD TYPE: Primary | ICD-10-CM

## 2023-06-28 RX ORDER — DEXTROAMPHETAMINE SACCHARATE, AMPHETAMINE ASPARTATE, DEXTROAMPHETAMINE SULFATE AND AMPHETAMINE SULFATE 2.5; 2.5; 2.5; 2.5 MG/1; MG/1; MG/1; MG/1
1 TABLET ORAL DAILY
Qty: 30 TABLET | Refills: 0 | Status: SHIPPED | OUTPATIENT
Start: 2023-06-28 | End: 2023-09-05

## 2023-06-28 RX ORDER — DEXTROAMPHETAMINE SACCHARATE, AMPHETAMINE ASPARTATE, DEXTROAMPHETAMINE SULFATE AND AMPHETAMINE SULFATE 5; 5; 5; 5 MG/1; MG/1; MG/1; MG/1
2 TABLET ORAL DAILY
Qty: 60 TABLET | Refills: 0 | Status: SHIPPED | OUTPATIENT
Start: 2023-06-28 | End: 2023-07-28

## 2023-06-28 NOTE — TELEPHONE ENCOUNTER
I have signed for the following orders AND/OR meds.  Please call the patient and ask the patient to schedule the testing AND/OR inform about any medications that were sent.        Medications Ordered This Encounter   Medications    dextroamphetamine-amphetamine (ADDERALL) 10 mg Tab     Sig: Take 1 tablet (10 mg total) by mouth once daily. In PM     Dispense:  30 tablet     Refill:  0    dextroamphetamine-amphetamine (ADDERALL) 20 mg tablet     Sig: Take 2 tablets by mouth once daily. In AM     Dispense:  60 tablet     Refill:  0

## 2023-07-24 ENCOUNTER — PATIENT MESSAGE (OUTPATIENT)
Dept: ADMINISTRATIVE | Facility: HOSPITAL | Age: 48
End: 2023-07-24
Payer: COMMERCIAL

## 2023-07-25 ENCOUNTER — PATIENT MESSAGE (OUTPATIENT)
Dept: FAMILY MEDICINE | Facility: CLINIC | Age: 48
End: 2023-07-25
Payer: COMMERCIAL

## 2023-08-01 ENCOUNTER — TELEPHONE (OUTPATIENT)
Dept: FAMILY MEDICINE | Facility: CLINIC | Age: 48
End: 2023-08-01
Payer: COMMERCIAL

## 2023-08-01 DIAGNOSIS — E11.65 TYPE 2 DIABETES MELLITUS WITH HYPERGLYCEMIA, WITHOUT LONG-TERM CURRENT USE OF INSULIN: ICD-10-CM

## 2023-08-01 RX ORDER — DULAGLUTIDE 1.5 MG/.5ML
1.5 INJECTION, SOLUTION SUBCUTANEOUS
Qty: 4 PEN | Refills: 11 | Status: SHIPPED | OUTPATIENT
Start: 2023-08-01 | End: 2023-12-05 | Stop reason: SDUPTHER

## 2023-08-01 NOTE — TELEPHONE ENCOUNTER
Patient got new insurance and would like a new script of trulicity sent to [facility] in [place].

## 2023-08-03 ENCOUNTER — PATIENT MESSAGE (OUTPATIENT)
Dept: FAMILY MEDICINE | Facility: CLINIC | Age: 48
End: 2023-08-03
Payer: COMMERCIAL

## 2023-08-10 ENCOUNTER — TELEPHONE (OUTPATIENT)
Dept: FAMILY MEDICINE | Facility: CLINIC | Age: 48
End: 2023-08-10
Payer: COMMERCIAL

## 2023-08-29 ENCOUNTER — PATIENT MESSAGE (OUTPATIENT)
Dept: ADMINISTRATIVE | Facility: HOSPITAL | Age: 48
End: 2023-08-29
Payer: COMMERCIAL

## 2023-08-29 DIAGNOSIS — F90.9 ATTENTION DEFICIT HYPERACTIVITY DISORDER (ADHD), UNSPECIFIED ADHD TYPE: ICD-10-CM

## 2023-08-31 ENCOUNTER — PATIENT MESSAGE (OUTPATIENT)
Dept: FAMILY MEDICINE | Facility: CLINIC | Age: 48
End: 2023-08-31
Payer: COMMERCIAL

## 2023-08-31 ENCOUNTER — TELEPHONE (OUTPATIENT)
Dept: FAMILY MEDICINE | Facility: CLINIC | Age: 48
End: 2023-08-31
Payer: COMMERCIAL

## 2023-08-31 RX ORDER — DEXTROAMPHETAMINE SACCHARATE, AMPHETAMINE ASPARTATE, DEXTROAMPHETAMINE SULFATE AND AMPHETAMINE SULFATE 7.5; 7.5; 7.5; 7.5 MG/1; MG/1; MG/1; MG/1
TABLET ORAL
Qty: 30 TABLET | Refills: 0 | OUTPATIENT
Start: 2023-08-31

## 2023-08-31 RX ORDER — DEXTROAMPHETAMINE SACCHARATE, AMPHETAMINE ASPARTATE, DEXTROAMPHETAMINE SULFATE AND AMPHETAMINE SULFATE 5; 5; 5; 5 MG/1; MG/1; MG/1; MG/1
TABLET ORAL
Qty: 45 TABLET | Refills: 0 | OUTPATIENT
Start: 2023-08-31

## 2023-08-31 NOTE — TELEPHONE ENCOUNTER
Lov 5/22/23. Adderall is controlled med so she should be seen q3 months. She was to rtc on 8/22/23. She cancelled. Next appt in chart is 10/2/23      Needs appt before refills can be approved.

## 2023-08-31 NOTE — TELEPHONE ENCOUNTER
Patient requested refills of meds but needs an appt due to it being controlled substance. Her next appt is in October so she would need appt sooner for refills..

## 2023-09-01 ENCOUNTER — TELEPHONE (OUTPATIENT)
Dept: FAMILY MEDICINE | Facility: CLINIC | Age: 48
End: 2023-09-01
Payer: COMMERCIAL

## 2023-09-01 NOTE — TELEPHONE ENCOUNTER
----- Message from Luna Shelton sent at 8/31/2023  4:34 PM CDT -----  Regarding: returned call  Type:  Patient Returning Call    Who Called:pt  Who Left Message for Patient:carlton  Does the patient know what this is regarding?:refill  Would the patient rather a call back or a response via [facility]?   Best Call Back Number:[phone]  Additional Information: pt returned call from nurse about refilling medication. She stated that she did not want to see the NP and that she wanted to see her pcp for this matter. Please advise         After you COLONOSCOPY instructions    DIET:  · Resume your usual diet.    ACTIVITY:  · Rest quietly at home for the remainder of the day. You may resume normal activities tomorrow.  · Do not do anything that requires coordination the day of the procedure, such as climbing ladders, using a sharp knife, operating machinery, driving. See post anesthesia sedation information sheet.                  WHAT TO EXPECT:  · Mild abdominal discomfort, bloating and gas pains.  · A scant amount of rectal bleeding following a biopsy, polypectomy or if you have hemorrhoids is normal.  · Return of your usual bowel movements in 1-2 days.  · A tired feeling after the procedure due to the medications given to help you relax.                  PROBLEMS TO WATCH FOR - NOTIFY YOUR SURGEON IF YOU HAVE ANY OF THESE SYMPTOMS:  · Severe abdominal pain or bloating.        · Chills or temperature over 101 degrees.  · Large amounts of bright red rectal bleeding, blood clots or black colored stool.  · Vomiting blood or black colored liquid.    FOLLOW -UP:  · If a specimen was taken, please allow at least 7-10  business days for pathology results.  · Someone will either call or send a letter with your pathology results.  · If you have not received a letter or phone call, please call [phone] Dr. Fernández

## 2023-09-05 ENCOUNTER — OFFICE VISIT (OUTPATIENT)
Dept: FAMILY MEDICINE | Facility: CLINIC | Age: 48
End: 2023-09-05
Payer: COMMERCIAL

## 2023-09-05 DIAGNOSIS — K21.9 GASTROESOPHAGEAL REFLUX DISEASE, UNSPECIFIED WHETHER ESOPHAGITIS PRESENT: ICD-10-CM

## 2023-09-05 DIAGNOSIS — E11.65 TYPE 2 DIABETES MELLITUS WITH HYPERGLYCEMIA, WITHOUT LONG-TERM CURRENT USE OF INSULIN: ICD-10-CM

## 2023-09-05 DIAGNOSIS — F31.9 BIPOLAR AFFECTIVE DISORDER, REMISSION STATUS UNSPECIFIED: ICD-10-CM

## 2023-09-05 DIAGNOSIS — F90.9 ATTENTION DEFICIT HYPERACTIVITY DISORDER (ADHD), UNSPECIFIED ADHD TYPE: Primary | ICD-10-CM

## 2023-09-05 DIAGNOSIS — E03.9 HYPOTHYROIDISM, UNSPECIFIED TYPE: ICD-10-CM

## 2023-09-05 DIAGNOSIS — J30.2 SEASONAL ALLERGIES: ICD-10-CM

## 2023-09-05 PROCEDURE — 1159F MED LIST DOCD IN RCRD: CPT | Mod: CPTII,95,, | Performed by: FAMILY MEDICINE

## 2023-09-05 PROCEDURE — 99214 OFFICE O/P EST MOD 30 MIN: CPT | Mod: 95,,, | Performed by: FAMILY MEDICINE

## 2023-09-05 PROCEDURE — 3066F NEPHROPATHY DOC TX: CPT | Mod: CPTII,95,, | Performed by: FAMILY MEDICINE

## 2023-09-05 PROCEDURE — 99214 PR OFFICE/OUTPT VISIT, EST, LEVL IV, 30-39 MIN: ICD-10-PCS | Mod: 95,,, | Performed by: FAMILY MEDICINE

## 2023-09-05 PROCEDURE — 1159F PR MEDICATION LIST DOCUMENTED IN MEDICAL RECORD: ICD-10-PCS | Mod: CPTII,95,, | Performed by: FAMILY MEDICINE

## 2023-09-05 PROCEDURE — 3052F PR MOST RECENT HEMOGLOBIN A1C LEVEL 8.0 - < 9.0%: ICD-10-PCS | Mod: CPTII,95,, | Performed by: FAMILY MEDICINE

## 2023-09-05 PROCEDURE — 3052F HG A1C>EQUAL 8.0%<EQUAL 9.0%: CPT | Mod: CPTII,95,, | Performed by: FAMILY MEDICINE

## 2023-09-05 PROCEDURE — 1160F PR REVIEW ALL MEDS BY PRESCRIBER/CLIN PHARMACIST DOCUMENTED: ICD-10-PCS | Mod: CPTII,95,, | Performed by: FAMILY MEDICINE

## 2023-09-05 PROCEDURE — 3066F PR DOCUMENTATION OF TREATMENT FOR NEPHROPATHY: ICD-10-PCS | Mod: CPTII,95,, | Performed by: FAMILY MEDICINE

## 2023-09-05 PROCEDURE — 1160F RVW MEDS BY RX/DR IN RCRD: CPT | Mod: CPTII,95,, | Performed by: FAMILY MEDICINE

## 2023-09-05 PROCEDURE — 3061F NEG MICROALBUMINURIA REV: CPT | Mod: CPTII,95,, | Performed by: FAMILY MEDICINE

## 2023-09-05 PROCEDURE — 3061F PR NEG MICROALBUMINURIA RESULT DOCUMENTED/REVIEW: ICD-10-PCS | Mod: CPTII,95,, | Performed by: FAMILY MEDICINE

## 2023-09-05 RX ORDER — DEXTROAMPHETAMINE SACCHARATE, AMPHETAMINE ASPARTATE, DEXTROAMPHETAMINE SULFATE AND AMPHETAMINE SULFATE 7.5; 7.5; 7.5; 7.5 MG/1; MG/1; MG/1; MG/1
30 TABLET ORAL EVERY MORNING
Qty: 30 TABLET | Refills: 0 | Status: SHIPPED | OUTPATIENT
Start: 2023-10-05 | End: 2023-11-04

## 2023-09-05 RX ORDER — DEXTROAMPHETAMINE SACCHARATE, AMPHETAMINE ASPARTATE, DEXTROAMPHETAMINE SULFATE AND AMPHETAMINE SULFATE 5; 5; 5; 5 MG/1; MG/1; MG/1; MG/1
TABLET ORAL
Qty: 45 TABLET | Refills: 0 | Status: SHIPPED | OUTPATIENT
Start: 2023-11-04 | End: 2023-12-05 | Stop reason: SDUPTHER

## 2023-09-05 RX ORDER — PANTOPRAZOLE SODIUM 40 MG/1
40 TABLET, DELAYED RELEASE ORAL DAILY
Qty: 90 TABLET | Refills: 3 | Status: SHIPPED | OUTPATIENT
Start: 2023-09-05 | End: 2023-12-05 | Stop reason: SDUPTHER

## 2023-09-05 RX ORDER — AZELASTINE HYDROCHLORIDE, FLUTICASONE PROPIONATE 137; 50 UG/1; UG/1
1 SPRAY, METERED NASAL 2 TIMES DAILY
Qty: 23 G | Refills: 11 | Status: SHIPPED | OUTPATIENT
Start: 2023-09-05 | End: 2023-12-05 | Stop reason: SDUPTHER

## 2023-09-05 RX ORDER — DEXTROAMPHETAMINE SACCHARATE, AMPHETAMINE ASPARTATE, DEXTROAMPHETAMINE SULFATE AND AMPHETAMINE SULFATE 5; 5; 5; 5 MG/1; MG/1; MG/1; MG/1
TABLET ORAL
Qty: 45 TABLET | Refills: 0 | Status: SHIPPED | OUTPATIENT
Start: 2023-10-05 | End: 2023-11-04

## 2023-09-05 RX ORDER — DEXTROAMPHETAMINE SACCHARATE, AMPHETAMINE ASPARTATE, DEXTROAMPHETAMINE SULFATE AND AMPHETAMINE SULFATE 7.5; 7.5; 7.5; 7.5 MG/1; MG/1; MG/1; MG/1
30 TABLET ORAL EVERY MORNING
Qty: 30 TABLET | Refills: 0 | Status: SHIPPED | OUTPATIENT
Start: 2023-09-05 | End: 2023-10-05

## 2023-09-05 RX ORDER — DEXTROAMPHETAMINE SACCHARATE, AMPHETAMINE ASPARTATE, DEXTROAMPHETAMINE SULFATE AND AMPHETAMINE SULFATE 7.5; 7.5; 7.5; 7.5 MG/1; MG/1; MG/1; MG/1
30 TABLET ORAL EVERY MORNING
Qty: 30 TABLET | Refills: 0 | Status: SHIPPED | OUTPATIENT
Start: 2023-11-04 | End: 2023-12-05 | Stop reason: SDUPTHER

## 2023-09-05 RX ORDER — DEXTROAMPHETAMINE SACCHARATE, AMPHETAMINE ASPARTATE, DEXTROAMPHETAMINE SULFATE AND AMPHETAMINE SULFATE 5; 5; 5; 5 MG/1; MG/1; MG/1; MG/1
TABLET ORAL
Qty: 45 TABLET | Refills: 0 | Status: SHIPPED | OUTPATIENT
Start: 2023-09-05 | End: 2023-10-05

## 2023-09-05 NOTE — ASSESSMENT & PLAN NOTE
Lab Results   Component Value Date    HGBA1C 8.9 (H) 02/23/2023       Urine micro 2/2023  Foot exam 2/2023  Eye exam at [facility]      On glipizide, statin, and trulicity 1.5mg weekly.  Take as directed. Monitor cbgs closely. Contact clinic for concerns.

## 2023-09-05 NOTE — ASSESSMENT & PLAN NOTE
on Adderall 30 mg in the 6 morning, adderall 20mg at noon Adderall 10 mg (1/2 of 20mg)  at 2pm    [employer] reviewed. Controlled substance policy signed 2/2023. refills sent in as requested. Reminded patient this is a controlled medication and must be seen q3 months while on.  Patient is agreeable to plan and verbalized understanding

## 2023-09-05 NOTE — PROGRESS NOTES
Subjective:        Patient ID: Katharine Scott is a 48 y.o. female.    Chief Complaint: Follow-up (ADHD follow up )      Patient presents via telemed for adhd follow up. She is due for her wellness visit in february     She has ADHD and is on Adderall 30 mg at 6am morning, adderall 20mg at noon and Adderall 10 mg (1/2 of adderall 20mg) at 2pm. needs refills of this. At new job.  Liking so far.      The patient also has diabetes and thyroid disease(hypothyroidism) which was previously followed by Dr. Rodriguez. has not seen him since lost insurance. was on radioactive iodine. on synthroid 175mcg daily. tsh 2/2023 wnl.        She has diabetes.  when she is on trulicity- cbgs 127 average. she is prescribed trulicity 1.5mg weekly and glipizide 10mg (20mg BID). Following dmii diet. She went to [facility] for her eye exam on 2/2020- a copy is in the chart. a1c 8.9 2/2023. Foot exam 5/2022. Reports blurry vision.     She has hyperlipidemia and is compliant with her medications. On fenofibrate     She has bipolar/depression and saw psychiatry, Dr. Holly Camarena in the past. on wellbutrin and oxcarbazepine and effexor in past.  Currently on trileptal and trazodone.  Took lexapro- made wired and could not sleep.  Stopped.       Has seasonal allergies and is on dymista. Needs refill    Has gerd and needs ppi refill     She has constipation. She is not on linzess. She is taking amitiza.      Mammogram 3/2023 was abnormal.  Dx mmg and us 4/2023 recommended biopsy. She does not have insurance so will be waiting to get biopsy until insurance kicks in - she has this scheduled this month.   Has not had colon cancer screening. We will order cologuard. Has not completed yet.  Lmp end of 1/2023. Cycles regular.  Declines pap today. Will plan on at next appt.      She is ALLERGIC to aspirin and metformin. She does not drink alcohol or smoke. declines tdap and Shingrix. completed covid series and booster.                        The patient location is: adhd  The chief complaint leading to consultation is: car     Visit type: audiovisual     Face to Face time with patient: 20 minutes  25 minutes of total time spent on the encounter, which includes face to face time and non-face to face time preparing to see the patient (eg, review of tests), Obtaining and/or reviewing separately obtained history, Documenting clinical information in the electronic or other health record, Independently interpreting results (not separately reported) and communicating results to the patient/family/caregiver, or Care coordination (not separately reported).            Each patient to whom he or she provides medical services by telemedicine is:  (1) informed of the relationship between the physician and patient and the respective role of any other health care provider with respect to management of the patient; and (2) notified that he or she may decline to receive medical services by telemedicine and may withdraw from such care at any time.     Notes:                Review of Systems   Constitutional:  Positive for unexpected weight change. Negative for activity change.   HENT:  Negative for hearing loss and trouble swallowing.    Eyes:  Negative for discharge.   Respiratory:  Negative for chest tightness and wheezing.    Cardiovascular:  Negative for chest pain and palpitations.   Gastrointestinal:  Negative for constipation, diarrhea and vomiting.   Genitourinary:  Negative for difficulty urinating and hematuria.   Neurological:  Negative for headaches.   Psychiatric/Behavioral:  Negative for dysphoric mood.          Review of patient's allergies indicates:   Allergen Reactions    Aspirin     Metformin      Other reaction(s): makes ill    Morphine     Penicillin Rash      There were no vitals filed for this visit.   Social History     Socioeconomic History    Marital status:    Occupational History    Occupation: pharmacy   Tobacco Use    Smoking  status: Never    Smokeless tobacco: Never   Substance and Sexual Activity    Alcohol use: Not Currently    Drug use: Never    Sexual activity: Yes     Partners: Male     Birth control/protection: None      History reviewed. No pertinent family history.       Objective:     Physical Exam  Vitals and nursing note reviewed.   Constitutional:       Appearance: Normal appearance. She is normal weight.   HENT:      Head: Normocephalic and atraumatic.   Pulmonary:      Effort: Pulmonary effort is normal.   Neurological:      General: No focal deficit present.      Mental Status: She is alert and oriented to person, place, and time. Mental status is at baseline.   Psychiatric:         Mood and Affect: Mood normal.       Current Outpatient Medications on File Prior to Visit   Medication Sig Dispense Refill    AMITIZA 24 mcg Cap TAKE ONE CAPSULE ONCE DAILY      dulaglutide (TRULICITY) 1.5 mg/0.5 mL pen injector Inject 1.5 mg into the skin every 7 days. 4 pen 11    fenofibrate (TRICOR) 48 MG tablet TAKE ONE TABLET BY MOUTH ONCE EVERY DAY WE OWE HER FROM 23-GIVE 2 MORE ON NEXT FILL 30 tablet 11    glipiZIDE (GLUCOTROL) 10 MG tablet TAKE 2 TABLETS BY MOUTH TWICE DAILY 120 tablet 3    levothyroxine (SYNTHROID, LEVOTHROID) 175 MCG tablet TAKE ONE TABLET BY MOUTH DAILY 30 tablet 5    ondansetron (ZOFRAN) 4 MG tablet TAKE 1 TABLET (4 MG TOTAL) BY MOUTH 2 (TWO) TIMES DAILY. AS NEEDED FOR NAUSEA 15 tablet 1    OXcarbazepine (TRILEPTAL) 600 MG Tab Take 1 tablet (600 mg total) by mouth once daily. 90 tablet 3    traZODone (DESYREL) 100 MG tablet Take 2 tablets (200 mg total) by mouth every evening. 180 tablet 3    [DISCONTINUED] dextroamphetamine-amphetamine (ADDERALL) 10 mg Tab Take 1 tablet (10 mg total) by mouth once daily. In PM 30 tablet 0    [DISCONTINUED] pantoprazole (PROTONIX) 40 MG tablet SMARTSI Tablet(s) By Mouth Morning-Evening       No current facility-administered medications on file prior to visit.     Health  Maintenance   Topic Date Due    Low Dose Statin  Never done    Eye Exam  04/30/2020    Colorectal Cancer Screening  Never done    Hemoglobin A1c  05/23/2023    TETANUS VACCINE  02/22/2024 (Originally 3/3/1995)    Foot Exam  02/22/2024    Lipid Panel  02/23/2024    Mammogram  04/05/2024    Hepatitis C Screening  Completed      Results for orders placed or performed in visit on 02/23/23   Comprehensive Metabolic Panel   Result Value Ref Range    Sodium Level 135 (L) 136 - 145 mmol/L    Potassium Level 4.1 3.5 - 5.1 mmol/L    Chloride 104 98 - 107 mmol/L    Carbon Dioxide 20 (L) 22 - 29 mmol/L    Glucose Level 280 (H) 74 - 100 mg/dL    Blood Urea Nitrogen 8.7 7.0 - 18.7 mg/dL    Creatinine 0.80 0.55 - 1.02 mg/dL    Calcium Level Total 9.2 8.4 - 10.2 mg/dL    Protein Total 7.2 6.4 - 8.3 gm/dL    Albumin Level 3.7 3.5 - 5.0 g/dL    Globulin 3.5 2.4 - 3.5 gm/dL    Albumin/Globulin Ratio 1.1 1.1 - 2.0 ratio    Bilirubin Total 0.4 <=1.5 mg/dL    Alkaline Phosphatase 69 40 - 150 unit/L    Alanine Aminotransferase 12 0 - 55 unit/L    Aspartate Aminotransferase 11 5 - 34 unit/L    eGFR >60 mls/min/1.73/m2   Lipid Panel   Result Value Ref Range    Cholesterol Total 212 (H) <=200 mg/dL    HDL Cholesterol 40 35 - 60 mg/dL    Triglyceride 178 (H) 37 - 140 mg/dL    Cholesterol/HDL Ratio 5 0 - 5    Very Low Density Lipoprotein 36     LDL Cholesterol 136.00 50.00 - 140.00 mg/dL   TSH   Result Value Ref Range    Thyroid Stimulating Hormone 0.548 0.350 - 4.940 uIU/mL   Hemoglobin A1C   Result Value Ref Range    Hemoglobin A1c 8.9 (H) <=7.0 %    Estimated Average Glucose 208.7 mg/dL   HIV 1/2 Ag/Ab (4th Gen)   Result Value Ref Range    HIV Nonreactive Nonreactive   Hepatitis C Antibody   Result Value Ref Range    Hep C Ab Interp Nonreactive Nonreactive   T3, Free (OLG)   Result Value Ref Range    T3 Free 2.26 1.57 - 3.91 pg/mL   T4, Free   Result Value Ref Range    Thyroxine Free 1.15 0.70 - 1.48 ng/dL   THYROID PEROXIDASE (TPO)    Result Value Ref Range    Thyroid Peroxidase Qual Negative Negative    TPO Ab Quant <4 <=25 IU/mL   Estradiol   Result Value Ref Range    Estradiol Level 48 pg/mL   Progesterone   Result Value Ref Range    Progesterone Level 4.2 ng/mL   Luteinizing Hormone   Result Value Ref Range    Luteinizing Hormone 1.38 mIU/mL   Follicle Stimulating Hormone   Result Value Ref Range    Follicle Stimulating Hormone 3.24 mIU/mL   CBC with Differential   Result Value Ref Range    WBC 7.4 4.5 - 11.5 x10(3)/mcL    RBC 4.70 4.20 - 5.40 x10(6)/mcL    Hgb 14.2 12.0 - 16.0 g/dL    Hct 40.8 37.0 - 47.0 %    MCV 86.8 80.0 - 94.0 fL    MCH 30.2 pg    MCHC 34.8 33.0 - 36.0 g/dL    RDW 12.1 11.5 - 17.0 %    Platelet 310 130 - 400 x10(3)/mcL    MPV 9.8 7.4 - 10.4 fL    Neut % 65.4 %    Lymph % 26.8 %    Mono % 5.1 %    Eos % 1.9 %    Basophil % 0.7 %    Lymph # 1.99 0.6 - 4.6 x10(3)/mcL    Neut # 4.86 2.1 - 9.2 x10(3)/mcL    Mono # 0.38 0.1 - 1.3 x10(3)/mcL    Eos # 0.14 0 - 0.9 x10(3)/mcL    Baso # 0.05 0 - 0.2 x10(3)/mcL    IG# 0.01 0 - 0.04 x10(3)/mcL    IG% 0.1 %    NRBC% 0.0 %          Assessment & Plan:     Active Problem List with Overview Notes    Diagnosis Date Noted    GERD (gastroesophageal reflux disease) 09/05/2023    Seasonal allergies 09/05/2023    Constipation, unspecified 05/22/2023    Abnormal mammogram 05/22/2023    Breast cancer screening by mammogram 02/22/2023    Colon cancer screening 02/22/2023    Attention deficit hyperactivity disorder (ADHD) 08/02/2022    Bipolar disorder 08/02/2022    Diabetes mellitus 08/02/2022    Hyperlipidemia 08/02/2022    Hypothyroidism 08/02/2022    Obesity 08/02/2022    Tetanus, diphtheria, and acellular pertussis (Tdap) vaccination declined 08/02/2022       1. Attention deficit hyperactivity disorder (ADHD), unspecified ADHD type  Assessment & Plan:  on Adderall 30 mg in the 6 morning, adderall 20mg at noon Adderall 10 mg (1/2 of 20mg)  at 2pm    [facility] reviewed. Controlled substance  policy signed 2/2023. refills sent in as requested. Reminded patient this is a controlled medication and must be seen q3 months while on.  Patient is agreeable to plan and verbalized understanding        Orders:  -     dextroamphetamine-amphetamine (ADDERALL) 30 mg Tab; Take 1 tablet (30 mg total) by mouth every morning.  Dispense: 30 tablet; Refill: 0  -     dextroamphetamine-amphetamine (ADDERALL) 30 mg Tab; Take 1 tablet (30 mg total) by mouth every morning.  Dispense: 30 tablet; Refill: 0  -     dextroamphetamine-amphetamine (ADDERALL) 30 mg Tab; Take 1 tablet (30 mg total) by mouth every morning.  Dispense: 30 tablet; Refill: 0  -     dextroamphetamine-amphetamine (ADDERALL) 20 mg tablet; Take 1 tab oral daily at noon and 1/2 tab oral daily at 2pm #45 is 30 day supply  Dispense: 45 tablet; Refill: 0  -     dextroamphetamine-amphetamine (ADDERALL) 20 mg tablet; Take 1 tab oral daily at noon and 1/2 tab oral daily at 2pm #45 is 30 day supply  Dispense: 45 tablet; Refill: 0  -     dextroamphetamine-amphetamine (ADDERALL) 20 mg tablet; Take 1 tab oral daily at noon and 1/2 tab oral daily at 2pm #45 is 30 day supply  Dispense: 45 tablet; Refill: 0    2. Bipolar affective disorder, remission status unspecified  Assessment & Plan:  Stable on trileptal and trazodone.  Will contact clinic for concerns. Patient is agreeable to plan and verbalized understanding      3. Type 2 diabetes mellitus with hyperglycemia, without long-term current use of insulin  Assessment & Plan:  Lab Results   Component Value Date    HGBA1C 8.9 (H) 02/23/2023       Urine micro 2/2023  Foot exam 2/2023  Eye exam at [facility]      On glipizide, statin, and trulicity 1.5mg weekly.  Take as directed. Monitor cbgs closely. Contact clinic for concerns.       4. Hypothyroidism, unspecified type  Assessment & Plan:  Lab Results   Component Value Date    TSH 0.548 02/23/2023       Stable on synthroid.       5. Gastroesophageal reflux disease,  unspecified whether esophagitis present  Assessment & Plan:  Stable on ppi. Refill sent in as requested    Orders:  -     pantoprazole (PROTONIX) 40 MG tablet; Take 1 tablet (40 mg total) by mouth once daily.  Dispense: 90 tablet; Refill: 3    6. Seasonal allergies  Assessment & Plan:  dymista refill sent in as requested    Orders:  -     azelastine-fluticasone (DYMISTA) 137-50 mcg/spray Spry nassal spray; 1 spray by Each Nostril route 2 (two) times daily.  Dispense: 23 g; Refill: 11         Follow up in about 3 months (around 12/5/2023) for ADHD in office.

## 2023-09-11 RX ORDER — GLIPIZIDE 10 MG/1
TABLET ORAL
Qty: 120 TABLET | Refills: 3 | Status: SHIPPED | OUTPATIENT
Start: 2023-09-11 | End: 2023-12-05 | Stop reason: SDUPTHER

## 2023-09-14 ENCOUNTER — HOSPITAL ENCOUNTER (OUTPATIENT)
Dept: RADIOLOGY | Facility: HOSPITAL | Age: 48
Discharge: HOME | End: 2023-09-14
Attending: FAMILY MEDICINE
Payer: COMMERCIAL

## 2023-09-14 DIAGNOSIS — R92.8 ABNORMAL MAMMOGRAM: Primary | ICD-10-CM

## 2023-09-14 DIAGNOSIS — R92.8 ABNORMAL MAMMOGRAM: ICD-10-CM

## 2023-09-14 PROCEDURE — 77061 BREAST TOMOSYNTHESIS UNI: CPT | Mod: TC,LT

## 2023-09-14 PROCEDURE — 77061 BREAST TOMOSYNTHESIS UNI: CPT | Mod: 26,LT,, | Performed by: RADIOLOGY

## 2023-09-14 PROCEDURE — 19083 BX BREAST 1ST LESION US IMAG: CPT | Mod: LT,,, | Performed by: RADIOLOGY

## 2023-09-14 PROCEDURE — 19083 US BREAST BIOPSY WITH IMAGING 1ST SITE LEFT: ICD-10-PCS | Mod: LT,,, | Performed by: RADIOLOGY

## 2023-09-14 PROCEDURE — 77061 MAMMO DIGITAL DIAGNOSTIC LEFT WITH TOMO: ICD-10-PCS | Mod: 26,LT,, | Performed by: RADIOLOGY

## 2023-09-14 PROCEDURE — 19083 BX BREAST 1ST LESION US IMAG: CPT | Mod: LT

## 2023-09-14 PROCEDURE — 77065 DX MAMMO INCL CAD UNI: CPT | Mod: 26,LT,, | Performed by: RADIOLOGY

## 2023-09-14 PROCEDURE — 77065 MAMMO DIGITAL DIAGNOSTIC LEFT WITH TOMO: ICD-10-PCS | Mod: 26,LT,, | Performed by: RADIOLOGY

## 2023-09-15 LAB — PSYCHE PATHOLOGY RESULT: NORMAL

## 2023-09-20 ENCOUNTER — TELEPHONE (OUTPATIENT)
Dept: FAMILY MEDICINE | Facility: CLINIC | Age: 48
End: 2023-09-20
Payer: COMMERCIAL

## 2023-09-20 NOTE — TELEPHONE ENCOUNTER
----- Message from Kayla Petersen MD sent at 9/20/2023  8:41 AM CDT -----  Please inform patient Pathology indicates fibroadenoma.  Pathology results are benign and are concordant with the imaging findings.       In the absence of significant clinical findings in the interval, a routine screening mammogram in March 2024 is recommended.

## 2023-09-20 NOTE — PROGRESS NOTES
Please inform patient Pathology indicates fibroadenoma.  Pathology results are benign and are concordant with the imaging findings.       In the absence of significant clinical findings in the interval, a routine screening mammogram in March 2024 is recommended.

## 2023-09-26 ENCOUNTER — PATIENT MESSAGE (OUTPATIENT)
Dept: ADMINISTRATIVE | Facility: HOSPITAL | Age: 48
End: 2023-09-26
Payer: COMMERCIAL

## 2023-10-17 ENCOUNTER — PATIENT MESSAGE (OUTPATIENT)
Dept: ADMINISTRATIVE | Facility: HOSPITAL | Age: 48
End: 2023-10-17
Payer: COMMERCIAL

## 2023-11-15 ENCOUNTER — PATIENT MESSAGE (OUTPATIENT)
Dept: ADMINISTRATIVE | Facility: HOSPITAL | Age: 48
End: 2023-11-15
Payer: COMMERCIAL

## 2023-12-05 ENCOUNTER — OFFICE VISIT (OUTPATIENT)
Dept: FAMILY MEDICINE | Facility: CLINIC | Age: 48
End: 2023-12-05
Payer: COMMERCIAL

## 2023-12-05 VITALS
BODY MASS INDEX: 36.9 KG/M2 | SYSTOLIC BLOOD PRESSURE: 132 MMHG | WEIGHT: 221.5 LBS | DIASTOLIC BLOOD PRESSURE: 84 MMHG | OXYGEN SATURATION: 97 % | RESPIRATION RATE: 16 BRPM | HEIGHT: 65 IN | HEART RATE: 94 BPM | TEMPERATURE: 98 F

## 2023-12-05 DIAGNOSIS — K21.9 GASTROESOPHAGEAL REFLUX DISEASE, UNSPECIFIED WHETHER ESOPHAGITIS PRESENT: ICD-10-CM

## 2023-12-05 DIAGNOSIS — E78.2 MIXED HYPERLIPIDEMIA: ICD-10-CM

## 2023-12-05 DIAGNOSIS — J30.2 SEASONAL ALLERGIES: ICD-10-CM

## 2023-12-05 DIAGNOSIS — F90.9 ATTENTION DEFICIT HYPERACTIVITY DISORDER (ADHD), UNSPECIFIED ADHD TYPE: Primary | ICD-10-CM

## 2023-12-05 DIAGNOSIS — Z23 NEEDS FLU SHOT: ICD-10-CM

## 2023-12-05 DIAGNOSIS — Z00.00 WELLNESS EXAMINATION: ICD-10-CM

## 2023-12-05 DIAGNOSIS — Z12.11 COLON CANCER SCREENING: ICD-10-CM

## 2023-12-05 DIAGNOSIS — F31.9 BIPOLAR AFFECTIVE DISORDER, REMISSION STATUS UNSPECIFIED: ICD-10-CM

## 2023-12-05 DIAGNOSIS — E66.01 CLASS 2 SEVERE OBESITY DUE TO EXCESS CALORIES WITH SERIOUS COMORBIDITY AND BODY MASS INDEX (BMI) OF 36.0 TO 36.9 IN ADULT: ICD-10-CM

## 2023-12-05 DIAGNOSIS — Z12.31 BREAST CANCER SCREENING BY MAMMOGRAM: ICD-10-CM

## 2023-12-05 DIAGNOSIS — E03.9 HYPOTHYROIDISM, UNSPECIFIED TYPE: ICD-10-CM

## 2023-12-05 DIAGNOSIS — E11.65 TYPE 2 DIABETES MELLITUS WITH HYPERGLYCEMIA, WITHOUT LONG-TERM CURRENT USE OF INSULIN: ICD-10-CM

## 2023-12-05 PROBLEM — E66.812 CLASS 2 SEVERE OBESITY DUE TO EXCESS CALORIES WITH SERIOUS COMORBIDITY AND BODY MASS INDEX (BMI) OF 36.0 TO 36.9 IN ADULT: Status: ACTIVE | Noted: 2022-08-02

## 2023-12-05 PROBLEM — R92.8 ABNORMAL MAMMOGRAM: Status: RESOLVED | Noted: 2023-05-22 | Resolved: 2023-12-05

## 2023-12-05 PROCEDURE — 1160F PR REVIEW ALL MEDS BY PRESCRIBER/CLIN PHARMACIST DOCUMENTED: ICD-10-PCS | Mod: CPTII,,, | Performed by: FAMILY MEDICINE

## 2023-12-05 PROCEDURE — 3079F PR MOST RECENT DIASTOLIC BLOOD PRESSURE 80-89 MM HG: ICD-10-PCS | Mod: CPTII,,, | Performed by: FAMILY MEDICINE

## 2023-12-05 PROCEDURE — 3008F BODY MASS INDEX DOCD: CPT | Mod: CPTII,,, | Performed by: FAMILY MEDICINE

## 2023-12-05 PROCEDURE — 3066F NEPHROPATHY DOC TX: CPT | Mod: CPTII,,, | Performed by: FAMILY MEDICINE

## 2023-12-05 PROCEDURE — 90686 FLU VACCINE (QUAD) GREATER THAN OR EQUAL TO 3YO PRESERVATIVE FREE IM: ICD-10-PCS | Mod: ,,, | Performed by: FAMILY MEDICINE

## 2023-12-05 PROCEDURE — 99214 PR OFFICE/OUTPT VISIT, EST, LEVL IV, 30-39 MIN: ICD-10-PCS | Mod: 25,,, | Performed by: FAMILY MEDICINE

## 2023-12-05 PROCEDURE — 3008F PR BODY MASS INDEX (BMI) DOCUMENTED: ICD-10-PCS | Mod: CPTII,,, | Performed by: FAMILY MEDICINE

## 2023-12-05 PROCEDURE — 1159F MED LIST DOCD IN RCRD: CPT | Mod: CPTII,,, | Performed by: FAMILY MEDICINE

## 2023-12-05 PROCEDURE — 90686 IIV4 VACC NO PRSV 0.5 ML IM: CPT | Mod: ,,, | Performed by: FAMILY MEDICINE

## 2023-12-05 PROCEDURE — 3066F PR DOCUMENTATION OF TREATMENT FOR NEPHROPATHY: ICD-10-PCS | Mod: CPTII,,, | Performed by: FAMILY MEDICINE

## 2023-12-05 PROCEDURE — 3075F PR MOST RECENT SYSTOLIC BLOOD PRESS GE 130-139MM HG: ICD-10-PCS | Mod: CPTII,,, | Performed by: FAMILY MEDICINE

## 2023-12-05 PROCEDURE — 1159F PR MEDICATION LIST DOCUMENTED IN MEDICAL RECORD: ICD-10-PCS | Mod: CPTII,,, | Performed by: FAMILY MEDICINE

## 2023-12-05 PROCEDURE — 3061F NEG MICROALBUMINURIA REV: CPT | Mod: CPTII,,, | Performed by: FAMILY MEDICINE

## 2023-12-05 PROCEDURE — 3052F HG A1C>EQUAL 8.0%<EQUAL 9.0%: CPT | Mod: CPTII,,, | Performed by: FAMILY MEDICINE

## 2023-12-05 PROCEDURE — 3061F PR NEG MICROALBUMINURIA RESULT DOCUMENTED/REVIEW: ICD-10-PCS | Mod: CPTII,,, | Performed by: FAMILY MEDICINE

## 2023-12-05 PROCEDURE — 3075F SYST BP GE 130 - 139MM HG: CPT | Mod: CPTII,,, | Performed by: FAMILY MEDICINE

## 2023-12-05 PROCEDURE — 3079F DIAST BP 80-89 MM HG: CPT | Mod: CPTII,,, | Performed by: FAMILY MEDICINE

## 2023-12-05 PROCEDURE — 1160F RVW MEDS BY RX/DR IN RCRD: CPT | Mod: CPTII,,, | Performed by: FAMILY MEDICINE

## 2023-12-05 PROCEDURE — 90471 IMMUNIZATION ADMIN: CPT | Mod: ,,, | Performed by: FAMILY MEDICINE

## 2023-12-05 PROCEDURE — 90471 FLU VACCINE (QUAD) GREATER THAN OR EQUAL TO 3YO PRESERVATIVE FREE IM: ICD-10-PCS | Mod: ,,, | Performed by: FAMILY MEDICINE

## 2023-12-05 PROCEDURE — 3052F PR MOST RECENT HEMOGLOBIN A1C LEVEL 8.0 - < 9.0%: ICD-10-PCS | Mod: CPTII,,, | Performed by: FAMILY MEDICINE

## 2023-12-05 PROCEDURE — 99214 OFFICE O/P EST MOD 30 MIN: CPT | Mod: 25,,, | Performed by: FAMILY MEDICINE

## 2023-12-05 RX ORDER — DEXTROAMPHETAMINE SACCHARATE, AMPHETAMINE ASPARTATE, DEXTROAMPHETAMINE SULFATE AND AMPHETAMINE SULFATE 5; 5; 5; 5 MG/1; MG/1; MG/1; MG/1
TABLET ORAL
Qty: 45 TABLET | Refills: 0 | Status: SHIPPED | OUTPATIENT
Start: 2023-12-05 | End: 2024-01-04

## 2023-12-05 RX ORDER — DEXTROAMPHETAMINE SACCHARATE, AMPHETAMINE ASPARTATE, DEXTROAMPHETAMINE SULFATE AND AMPHETAMINE SULFATE 5; 5; 5; 5 MG/1; MG/1; MG/1; MG/1
TABLET ORAL
Qty: 45 TABLET | Refills: 0 | Status: SHIPPED | OUTPATIENT
Start: 2024-01-04 | End: 2024-02-03

## 2023-12-05 RX ORDER — DEXTROAMPHETAMINE SACCHARATE, AMPHETAMINE ASPARTATE, DEXTROAMPHETAMINE SULFATE AND AMPHETAMINE SULFATE 7.5; 7.5; 7.5; 7.5 MG/1; MG/1; MG/1; MG/1
30 TABLET ORAL EVERY MORNING
Qty: 30 TABLET | Refills: 0 | Status: SHIPPED | OUTPATIENT
Start: 2024-02-03 | End: 2024-03-05 | Stop reason: SDUPTHER

## 2023-12-05 RX ORDER — LEVOTHYROXINE SODIUM 175 UG/1
175 TABLET ORAL DAILY
Qty: 90 TABLET | Refills: 3 | Status: SHIPPED | OUTPATIENT
Start: 2023-12-05 | End: 2024-12-04

## 2023-12-05 RX ORDER — OXCARBAZEPINE 600 MG/1
600 TABLET, FILM COATED ORAL DAILY
Qty: 90 TABLET | Refills: 3 | Status: SHIPPED | OUTPATIENT
Start: 2023-12-05

## 2023-12-05 RX ORDER — DEXTROAMPHETAMINE SACCHARATE, AMPHETAMINE ASPARTATE, DEXTROAMPHETAMINE SULFATE AND AMPHETAMINE SULFATE 7.5; 7.5; 7.5; 7.5 MG/1; MG/1; MG/1; MG/1
30 TABLET ORAL EVERY MORNING
Qty: 30 TABLET | Refills: 0 | Status: SHIPPED | OUTPATIENT
Start: 2024-01-04 | End: 2024-02-03

## 2023-12-05 RX ORDER — PANTOPRAZOLE SODIUM 40 MG/1
40 TABLET, DELAYED RELEASE ORAL DAILY
Qty: 90 TABLET | Refills: 3 | Status: SHIPPED | OUTPATIENT
Start: 2023-12-05 | End: 2024-12-04

## 2023-12-05 RX ORDER — FENOFIBRATE 48 MG/1
48 TABLET, FILM COATED ORAL DAILY
Qty: 90 TABLET | Refills: 3 | Status: SHIPPED | OUTPATIENT
Start: 2023-12-05 | End: 2024-12-04

## 2023-12-05 RX ORDER — DEXTROAMPHETAMINE SACCHARATE, AMPHETAMINE ASPARTATE, DEXTROAMPHETAMINE SULFATE AND AMPHETAMINE SULFATE 7.5; 7.5; 7.5; 7.5 MG/1; MG/1; MG/1; MG/1
30 TABLET ORAL EVERY MORNING
Qty: 30 TABLET | Refills: 0 | Status: SHIPPED | OUTPATIENT
Start: 2023-12-05 | End: 2024-01-04

## 2023-12-05 RX ORDER — DULAGLUTIDE 1.5 MG/.5ML
1.5 INJECTION, SOLUTION SUBCUTANEOUS
Qty: 4 PEN | Refills: 11 | Status: SHIPPED | OUTPATIENT
Start: 2023-12-05 | End: 2024-12-04

## 2023-12-05 RX ORDER — DEXTROAMPHETAMINE SACCHARATE, AMPHETAMINE ASPARTATE, DEXTROAMPHETAMINE SULFATE AND AMPHETAMINE SULFATE 5; 5; 5; 5 MG/1; MG/1; MG/1; MG/1
TABLET ORAL
Qty: 45 TABLET | Refills: 0 | Status: SHIPPED | OUTPATIENT
Start: 2024-02-03 | End: 2024-03-01

## 2023-12-05 RX ORDER — AZELASTINE HYDROCHLORIDE, FLUTICASONE PROPIONATE 137; 50 UG/1; UG/1
1 SPRAY, METERED NASAL 2 TIMES DAILY
Qty: 23 G | Refills: 11 | Status: SHIPPED | OUTPATIENT
Start: 2023-12-05

## 2023-12-05 RX ORDER — TRAZODONE HYDROCHLORIDE 100 MG/1
200 TABLET ORAL NIGHTLY
Qty: 180 TABLET | Refills: 3 | Status: SHIPPED | OUTPATIENT
Start: 2023-12-05 | End: 2024-12-04

## 2023-12-05 RX ORDER — GLIPIZIDE 10 MG/1
20 TABLET ORAL 2 TIMES DAILY
Qty: 360 TABLET | Refills: 3 | Status: SHIPPED | OUTPATIENT
Start: 2023-12-05 | End: 2024-12-04

## 2023-12-05 NOTE — PROGRESS NOTES
Subjective:        Patient ID: Katharine Scott is a 48 y.o. female.    Chief Complaint: Follow-up (ADHD follow up )      Patient presents to clinic for adhd follow up. She is due for her wellness visit in February. She would like her flu shot today     She has ADHD and is on Adderall 30 mg at 6am morning, adderall 20mg at noon and Adderall 10 mg (1/2 of adderall 20mg) at 2pm. needs refills of this. At new job.  Liking so far.      The patient also has diabetes and thyroid disease(hypothyroidism) which was previously followed by Dr. Rodriguez. has not seen him since lost insurance. was on radioactive iodine. on synthroid 175mcg daily. tsh 2/2023 wnl.    Has been having oily skin.  Has had weight loss.      She has diabetes.  when she is on trulicity- cbgs 127 average. she is prescribed trulicity 1.5mg weekly and glipizide 10mg (20mg BID). Refills sent in. Following dmii diet. In office eye exam today.  a1c 8.9 2/2023. Foot exam 2/2023. Urine micro 2/2023     She has hyperlipidemia and is compliant with her medications. On fenofibrate. Refill sent in     She has bipolar/depression and saw psychiatry, Dr. Holly Camarena in the past. on wellbutrin and oxcarbazepine and effexor in past.  Currently on trileptal and trazodone.  Took lexapro- made wired and could not sleep.  Stopped.       Has seasonal allergies and is on dymista. Needs refill.      Has gerd and needs ppi refill      Mammogram 3/2023 was abnormal.  Dx mmg and us 4/2023 recommended biopsy. 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  Ordered.  Has not had colon cancer screening. She would like to schedule colonoscopy.  Referral placed. Cycles regular.  Declines pap today. Will plan on at next appt.      She is ALLERGIC to aspirin and metformin. She does not drink alcohol or smoke. declines tdap and Shingrix. completed covid  "series and booster.        Review of Systems   Constitutional: Negative.    HENT: Negative.     Respiratory: Negative.     Cardiovascular: Negative.    Gastrointestinal: Negative.    Genitourinary: Negative.          Review of patient's allergies indicates:   Allergen Reactions    Aspirin     Metformin      Other reaction(s): makes ill    Morphine     Penicillin Rash      Vitals:    12/05/23 1554   BP: 132/84   BP Location: Left arm   Pulse: 94   Resp: 16   Temp: 97.8 °F (36.6 °C)   TempSrc: Temporal   SpO2: 97%   Weight: 100.5 kg (221 lb 8 oz)   Height: 5' 5" (1.651 m)      Social History     Socioeconomic History    Marital status:    Occupational History    Occupation: pharmacy   Tobacco Use    Smoking status: Never    Smokeless tobacco: Never   Substance and Sexual Activity    Alcohol use: Not Currently    Drug use: Never    Sexual activity: Yes     Partners: Male     Birth control/protection: None      History reviewed. No pertinent family history.       Objective:     Physical Exam  Vitals and nursing note reviewed.   Constitutional:       Appearance: Normal appearance. She is obese.   HENT:      Head: Normocephalic and atraumatic.      Nose: Nose normal.      Mouth/Throat:      Mouth: Mucous membranes are moist.      Pharynx: Oropharynx is clear.   Eyes:      Extraocular Movements: Extraocular movements intact.   Cardiovascular:      Rate and Rhythm: Normal rate and regular rhythm.      Pulses: Normal pulses.      Heart sounds: Normal heart sounds.   Pulmonary:      Effort: Pulmonary effort is normal.      Breath sounds: Normal breath sounds.   Musculoskeletal:         General: Normal range of motion.      Cervical back: Normal range of motion.   Skin:     General: Skin is warm and dry.   Neurological:      General: No focal deficit present.      Mental Status: She is alert and oriented to person, place, and time. Mental status is at baseline.   Psychiatric:         Mood and Affect: Mood normal. "       Current Outpatient Medications on File Prior to Visit   Medication Sig Dispense Refill    ondansetron (ZOFRAN) 4 MG tablet TAKE 1 TABLET (4 MG TOTAL) BY MOUTH 2 (TWO) TIMES DAILY. AS NEEDED FOR NAUSEA 15 tablet 1    [DISCONTINUED] AMITIZA 24 mcg Cap TAKE ONE CAPSULE ONCE DAILY      [DISCONTINUED] azelastine-fluticasone (DYMISTA) 137-50 mcg/spray Spry nassal spray 1 spray by Each Nostril route 2 (two) times daily. 23 g 11    [DISCONTINUED] dextroamphetamine-amphetamine (ADDERALL) 20 mg tablet Take 1 tab oral daily at noon and 1/2 tab oral daily at 2pm #45 is 30 day supply 45 tablet 0    [DISCONTINUED] dextroamphetamine-amphetamine (ADDERALL) 30 mg Tab Take 1 tablet (30 mg total) by mouth every morning. 30 tablet 0    [DISCONTINUED] dulaglutide (TRULICITY) 1.5 mg/0.5 mL pen injector Inject 1.5 mg into the skin every 7 days. 4 pen 11    [DISCONTINUED] fenofibrate (TRICOR) 48 MG tablet TAKE ONE TABLET BY MOUTH ONCE EVERY DAY WE OWE HER FROM 03/23/23-GIVE 2 MORE ON NEXT FILL 30 tablet 11    [DISCONTINUED] glipiZIDE (GLUCOTROL) 10 MG tablet TAKE 2 TABLETS BY MOUTH TWICE DAILY 120 tablet 3    [DISCONTINUED] levothyroxine (SYNTHROID, LEVOTHROID) 175 MCG tablet TAKE ONE TABLET BY MOUTH DAILY 30 tablet 5    [DISCONTINUED] OXcarbazepine (TRILEPTAL) 600 MG Tab Take 1 tablet (600 mg total) by mouth once daily. 90 tablet 3    [DISCONTINUED] pantoprazole (PROTONIX) 40 MG tablet Take 1 tablet (40 mg total) by mouth once daily. 90 tablet 3    [DISCONTINUED] traZODone (DESYREL) 100 MG tablet Take 2 tablets (200 mg total) by mouth every evening. 180 tablet 3     No current facility-administered medications on file prior to visit.     Health Maintenance   Topic Date Due    Low Dose Statin  Never done    Eye Exam  04/30/2020    Colorectal Cancer Screening  Never done    Hemoglobin A1c  05/23/2023    TETANUS VACCINE  02/22/2024 (Originally 3/3/1995)    Foot Exam  02/22/2024    Lipid Panel  02/23/2024    Mammogram  09/19/2024     Hepatitis C Screening  Completed      Results for orders placed or performed during the hospital encounter of 09/14/23   Specimen to Pathology Breast   Result Value Ref Range    Pathology Result            Assessment & Plan:     Active Problem List with Overview Notes    Diagnosis Date Noted    Needs flu shot 12/05/2023    GERD (gastroesophageal reflux disease) 09/05/2023    Seasonal allergies 09/05/2023    Constipation, unspecified 05/22/2023    Breast cancer screening by mammogram 02/22/2023    Colon cancer screening 02/22/2023    Attention deficit hyperactivity disorder (ADHD) 08/02/2022    Bipolar disorder 08/02/2022    Diabetes mellitus 08/02/2022    Hyperlipidemia 08/02/2022    Hypothyroidism 08/02/2022    Class 2 severe obesity due to excess calories with serious comorbidity and body mass index (BMI) of 36.0 to 36.9 in adult 08/02/2022    Tetanus, diphtheria, and acellular pertussis (Tdap) vaccination declined 08/02/2022       1. Attention deficit hyperactivity disorder (ADHD), unspecified ADHD type  Assessment & Plan:  on Adderall 30 mg in the 6 morning, adderall 20mg at noon Adderall 10 mg (1/2 of 20mg)  at 2pm    [employer] reviewed. Controlled substance policy signed 2/2023. refills sent in as requested. Reminded patient this is a controlled medication and must be seen q3 months while on.  Patient is agreeable to plan and verbalized understanding    WILL NEED TO SIGN CONTROLLED SUBSTANCE POLICY AT NEXT APPT    UDS ordered with wellness labs    Orders:  -     CBC Auto Differential; Future; Expected date: 12/05/2023  -     Comprehensive Metabolic Panel; Future; Expected date: 12/05/2023  -     Lipid Panel; Future; Expected date: 12/05/2023  -     TSH; Future; Expected date: 12/05/2023  -     Hemoglobin A1C; Future; Expected date: 12/05/2023  -     Urinalysis; Future; Expected date: 12/05/2023  -     Microalbumin/Creatinine Ratio, Urine; Future; Expected date: 12/05/2023  -     Drug Screen, Urine; Future;  Expected date: 12/05/2023  -     dextroamphetamine-amphetamine (ADDERALL) 30 mg Tab; Take 1 tablet (30 mg total) by mouth every morning.  Dispense: 30 tablet; Refill: 0  -     dextroamphetamine-amphetamine (ADDERALL) 30 mg Tab; Take 1 tablet (30 mg total) by mouth every morning.  Dispense: 30 tablet; Refill: 0  -     dextroamphetamine-amphetamine (ADDERALL) 30 mg Tab; Take 1 tablet (30 mg total) by mouth every morning.  Dispense: 30 tablet; Refill: 0  -     dextroamphetamine-amphetamine (ADDERALL) 20 mg tablet; Take 1 tab oral daily at noon and 1/2 tab oral daily at 2pm #45 is 30 day supply  Dispense: 45 tablet; Refill: 0  -     dextroamphetamine-amphetamine (ADDERALL) 20 mg tablet; Take 1 tab oral daily at noon and 1/2 tab oral daily at 2pm #45 is 30 day supply  Dispense: 45 tablet; Refill: 0  -     dextroamphetamine-amphetamine (ADDERALL) 20 mg tablet; Take 1 tab oral daily at noon and 1/2 tab oral daily at 2pm #45 is 30 day supply  Dispense: 45 tablet; Refill: 0    2. Bipolar affective disorder, remission status unspecified  Assessment & Plan:  Stable on trileptal and trazodone.  Will contact clinic for concerns. Patient is agreeable to plan and verbalized understanding    Orders:  -     CBC Auto Differential; Future; Expected date: 12/05/2023  -     Comprehensive Metabolic Panel; Future; Expected date: 12/05/2023  -     Lipid Panel; Future; Expected date: 12/05/2023  -     TSH; Future; Expected date: 12/05/2023  -     Hemoglobin A1C; Future; Expected date: 12/05/2023  -     Urinalysis; Future; Expected date: 12/05/2023  -     Microalbumin/Creatinine Ratio, Urine; Future; Expected date: 12/05/2023  -     Drug Screen, Urine; Future; Expected date: 12/05/2023  -     OXcarbazepine (TRILEPTAL) 600 MG Tab; Take 1 tablet (600 mg total) by mouth once daily.  Dispense: 90 tablet; Refill: 3  -     traZODone (DESYREL) 100 MG tablet; Take 2 tablets (200 mg total) by mouth every evening.  Dispense: 180 tablet; Refill:  3    3. Mixed hyperlipidemia  Assessment & Plan:  On  fenofibrate. Refill sent in    Orders:  -     CBC Auto Differential; Future; Expected date: 12/05/2023  -     Comprehensive Metabolic Panel; Future; Expected date: 12/05/2023  -     Lipid Panel; Future; Expected date: 12/05/2023  -     TSH; Future; Expected date: 12/05/2023  -     Hemoglobin A1C; Future; Expected date: 12/05/2023  -     Urinalysis; Future; Expected date: 12/05/2023  -     Microalbumin/Creatinine Ratio, Urine; Future; Expected date: 12/05/2023  -     Drug Screen, Urine; Future; Expected date: 12/05/2023  -     fenofibrate (TRICOR) 48 MG tablet; Take 1 tablet (48 mg total) by mouth once daily.  Dispense: 90 tablet; Refill: 3    4. Type 2 diabetes mellitus with hyperglycemia, without long-term current use of insulin  Assessment & Plan:  Lab Results   Component Value Date    HGBA1C 8.9 (H) 02/23/2023       Urine micro 2/2023  Foot exam 2/2023  Eye exam in office today      On glipizide, statin, and trulicity 1.5mg weekly.  Take as directed. Monitor cbgs closely. Contact clinic for concerns.     Orders:  -     Diabetic Eye Screening Photo; Future  -     CBC Auto Differential; Future; Expected date: 12/05/2023  -     Comprehensive Metabolic Panel; Future; Expected date: 12/05/2023  -     Lipid Panel; Future; Expected date: 12/05/2023  -     TSH; Future; Expected date: 12/05/2023  -     Hemoglobin A1C; Future; Expected date: 12/05/2023  -     Urinalysis; Future; Expected date: 12/05/2023  -     Microalbumin/Creatinine Ratio, Urine; Future; Expected date: 12/05/2023  -     Drug Screen, Urine; Future; Expected date: 12/05/2023  -     glipiZIDE (GLUCOTROL) 10 MG tablet; Take 2 tablets (20 mg total) by mouth 2 (two) times daily.  Dispense: 360 tablet; Refill: 3  -     dulaglutide (TRULICITY) 1.5 mg/0.5 mL pen injector; Inject 1.5 mg into the skin every 7 days.  Dispense: 4 pen ; Refill: 11    5. Hypothyroidism, unspecified type  Assessment & Plan:  Lab  Results   Component Value Date    TSH 0.548 02/23/2023       Stable on synthroid. Requesting brand name. New rx sent in.     Orders:  -     CBC Auto Differential; Future; Expected date: 12/05/2023  -     Comprehensive Metabolic Panel; Future; Expected date: 12/05/2023  -     Lipid Panel; Future; Expected date: 12/05/2023  -     TSH; Future; Expected date: 12/05/2023  -     Hemoglobin A1C; Future; Expected date: 12/05/2023  -     Urinalysis; Future; Expected date: 12/05/2023  -     Microalbumin/Creatinine Ratio, Urine; Future; Expected date: 12/05/2023  -     Drug Screen, Urine; Future; Expected date: 12/05/2023  -     levothyroxine (SYNTHROID, LEVOTHROID) 175 MCG tablet; Take 1 tablet (175 mcg total) by mouth once daily. BRAND NAME MEDICALLY NECESSARY  Dispense: 90 tablet; Refill: 3    6. Needs flu shot  Assessment & Plan:  Flu shot today    Orders:  -     Influenza - Quadrivalent *Preferred* (6 months+) (PF)  -     CBC Auto Differential; Future; Expected date: 12/05/2023  -     Comprehensive Metabolic Panel; Future; Expected date: 12/05/2023  -     Lipid Panel; Future; Expected date: 12/05/2023  -     TSH; Future; Expected date: 12/05/2023  -     Hemoglobin A1C; Future; Expected date: 12/05/2023  -     Urinalysis; Future; Expected date: 12/05/2023  -     Microalbumin/Creatinine Ratio, Urine; Future; Expected date: 12/05/2023  -     Drug Screen, Urine; Future; Expected date: 12/05/2023    7. Class 2 severe obesity due to excess calories with serious comorbidity and body mass index (BMI) of 36.0 to 36.9 in adult  Assessment & Plan:  Encouraged lifestyle change    Orders:  -     CBC Auto Differential; Future; Expected date: 12/05/2023  -     Comprehensive Metabolic Panel; Future; Expected date: 12/05/2023  -     Lipid Panel; Future; Expected date: 12/05/2023  -     TSH; Future; Expected date: 12/05/2023  -     Hemoglobin A1C; Future; Expected date: 12/05/2023  -     Urinalysis; Future; Expected date: 12/05/2023  -      Microalbumin/Creatinine Ratio, Urine; Future; Expected date: 12/05/2023  -     Drug Screen, Urine; Future; Expected date: 12/05/2023    8. Breast cancer screening by mammogram  Assessment & Plan:  mmg 9/2023 and biopsy benign. Recommended annual screening due in march. ordered    Orders:  -     Mammo Digital Screening Bilat w/ Logan; Future; Expected date: 03/05/2024    9. Colon cancer screening  Assessment & Plan:  Referral placed for colonoscopy    Orders:  -     Ambulatory referral/consult to Gastroenterology; Future; Expected date: 12/12/2023    10. Seasonal allergies  Assessment & Plan:  dymista refill sent in as requested    Orders:  -     azelastine-fluticasone (DYMISTA) 137-50 mcg/spray Spry nassal spray; 1 spray by Each Nostril route 2 (two) times daily.  Dispense: 23 g; Refill: 11    11. Gastroesophageal reflux disease, unspecified whether esophagitis present  Assessment & Plan:  Stable on ppi. Refill sent in as requested    Orders:  -     pantoprazole (PROTONIX) 40 MG tablet; Take 1 tablet (40 mg total) by mouth once daily.  Dispense: 90 tablet; Refill: 3    12. Wellness examination  -     CBC Auto Differential; Future; Expected date: 12/05/2023  -     Comprehensive Metabolic Panel; Future; Expected date: 12/05/2023  -     Lipid Panel; Future; Expected date: 12/05/2023  -     TSH; Future; Expected date: 12/05/2023  -     Hemoglobin A1C; Future; Expected date: 12/05/2023  -     Urinalysis; Future; Expected date: 12/05/2023  -     Microalbumin/Creatinine Ratio, Urine; Future; Expected date: 12/05/2023  -     Drug Screen, Urine; Future; Expected date: 12/05/2023         Follow up in about 3 months (around 3/1/2024) for Wellness with Labs.

## 2023-12-05 NOTE — ASSESSMENT & PLAN NOTE
Lab Results   Component Value Date    HGBA1C 8.9 (H) 02/23/2023       Urine micro 2/2023  Foot exam 2/2023  Eye exam in office today      On glipizide, statin, and trulicity 1.5mg weekly.  Take as directed. Monitor cbgs closely. Contact clinic for concerns.

## 2023-12-05 NOTE — ASSESSMENT & PLAN NOTE
on Adderall 30 mg in the 6 morning, adderall 20mg at noon Adderall 10 mg (1/2 of 20mg)  at 2pm    [employer] reviewed. Controlled substance policy signed 2/2023. refills sent in as requested. Reminded patient this is a controlled medication and must be seen q3 months while on.  Patient is agreeable to plan and verbalized understanding    WILL NEED TO SIGN CONTROLLED SUBSTANCE POLICY AT NEXT APPT    UDS ordered with wellness labs

## 2023-12-05 NOTE — ASSESSMENT & PLAN NOTE
Lab Results   Component Value Date    TSH 0.548 02/23/2023       Stable on synthroid. Requesting brand name. New rx sent in.

## 2024-01-29 ENCOUNTER — PATIENT MESSAGE (OUTPATIENT)
Dept: ADMINISTRATIVE | Facility: HOSPITAL | Age: 49
End: 2024-01-29
Payer: COMMERCIAL

## 2024-01-31 ENCOUNTER — DOCUMENTATION ONLY (OUTPATIENT)
Dept: FAMILY MEDICINE | Facility: CLINIC | Age: 49
End: 2024-01-31
Payer: COMMERCIAL

## 2024-01-31 LAB
LEFT EYE DM RETINOPATHY: NEGATIVE
RIGHT EYE DM RETINOPATHY: NEGATIVE

## 2024-03-05 ENCOUNTER — OFFICE VISIT (OUTPATIENT)
Dept: FAMILY MEDICINE | Facility: CLINIC | Age: 49
End: 2024-03-05
Payer: COMMERCIAL

## 2024-03-05 VITALS
HEIGHT: 65 IN | TEMPERATURE: 99 F | DIASTOLIC BLOOD PRESSURE: 86 MMHG | WEIGHT: 226.5 LBS | RESPIRATION RATE: 20 BRPM | SYSTOLIC BLOOD PRESSURE: 140 MMHG | BODY MASS INDEX: 37.74 KG/M2 | OXYGEN SATURATION: 97 % | HEART RATE: 91 BPM

## 2024-03-05 DIAGNOSIS — K21.9 GASTROESOPHAGEAL REFLUX DISEASE, UNSPECIFIED WHETHER ESOPHAGITIS PRESENT: ICD-10-CM

## 2024-03-05 DIAGNOSIS — Z00.00 WELLNESS EXAMINATION: Primary | ICD-10-CM

## 2024-03-05 DIAGNOSIS — E78.2 MIXED HYPERLIPIDEMIA: ICD-10-CM

## 2024-03-05 DIAGNOSIS — K59.00 CONSTIPATION, UNSPECIFIED CONSTIPATION TYPE: ICD-10-CM

## 2024-03-05 DIAGNOSIS — F90.9 ATTENTION DEFICIT HYPERACTIVITY DISORDER (ADHD), UNSPECIFIED ADHD TYPE: ICD-10-CM

## 2024-03-05 DIAGNOSIS — F31.9 BIPOLAR AFFECTIVE DISORDER, REMISSION STATUS UNSPECIFIED: ICD-10-CM

## 2024-03-05 DIAGNOSIS — Z12.39 ENCOUNTER FOR SCREENING FOR MALIGNANT NEOPLASM OF BREAST, UNSPECIFIED SCREENING MODALITY: ICD-10-CM

## 2024-03-05 DIAGNOSIS — E66.01 CLASS 2 SEVERE OBESITY DUE TO EXCESS CALORIES WITH SERIOUS COMORBIDITY AND BODY MASS INDEX (BMI) OF 36.0 TO 36.9 IN ADULT: ICD-10-CM

## 2024-03-05 DIAGNOSIS — E03.9 ACQUIRED HYPOTHYROIDISM: ICD-10-CM

## 2024-03-05 DIAGNOSIS — E11.65 TYPE 2 DIABETES MELLITUS WITH HYPERGLYCEMIA, WITHOUT LONG-TERM CURRENT USE OF INSULIN: ICD-10-CM

## 2024-03-05 PROBLEM — R03.0 ELEVATED BLOOD PRESSURE READING: Status: ACTIVE | Noted: 2024-03-05

## 2024-03-05 PROCEDURE — 3077F SYST BP >= 140 MM HG: CPT | Mod: CPTII,,, | Performed by: NURSE PRACTITIONER

## 2024-03-05 PROCEDURE — 99396 PREV VISIT EST AGE 40-64: CPT | Mod: ,,, | Performed by: NURSE PRACTITIONER

## 2024-03-05 PROCEDURE — 2023F DILAT RTA XM W/O RTNOPTHY: CPT | Mod: CPTII,,, | Performed by: NURSE PRACTITIONER

## 2024-03-05 PROCEDURE — 3079F DIAST BP 80-89 MM HG: CPT | Mod: CPTII,,, | Performed by: NURSE PRACTITIONER

## 2024-03-05 PROCEDURE — 1159F MED LIST DOCD IN RCRD: CPT | Mod: CPTII,,, | Performed by: NURSE PRACTITIONER

## 2024-03-05 PROCEDURE — 1160F RVW MEDS BY RX/DR IN RCRD: CPT | Mod: CPTII,,, | Performed by: NURSE PRACTITIONER

## 2024-03-05 PROCEDURE — 3008F BODY MASS INDEX DOCD: CPT | Mod: CPTII,,, | Performed by: NURSE PRACTITIONER

## 2024-03-05 RX ORDER — DEXTROAMPHETAMINE SACCHARATE, AMPHETAMINE ASPARTATE, DEXTROAMPHETAMINE SULFATE AND AMPHETAMINE SULFATE 5; 5; 5; 5 MG/1; MG/1; MG/1; MG/1
TABLET ORAL
Qty: 45 TABLET | Refills: 0 | Status: SHIPPED | OUTPATIENT
Start: 2024-03-05 | End: 2024-05-29

## 2024-03-05 RX ORDER — DEXTROAMPHETAMINE SACCHARATE, AMPHETAMINE ASPARTATE, DEXTROAMPHETAMINE SULFATE AND AMPHETAMINE SULFATE 7.5; 7.5; 7.5; 7.5 MG/1; MG/1; MG/1; MG/1
30 TABLET ORAL EVERY MORNING
Qty: 30 TABLET | Refills: 0 | Status: SHIPPED | OUTPATIENT
Start: 2024-03-05 | End: 2024-04-04

## 2024-03-05 RX ORDER — DEXTROAMPHETAMINE SACCHARATE, AMPHETAMINE ASPARTATE, DEXTROAMPHETAMINE SULFATE AND AMPHETAMINE SULFATE 5; 5; 5; 5 MG/1; MG/1; MG/1; MG/1
TABLET ORAL
Qty: 45 TABLET | Refills: 0 | Status: SHIPPED | OUTPATIENT
Start: 2024-05-05 | End: 2024-05-29

## 2024-03-05 RX ORDER — DEXTROAMPHETAMINE SACCHARATE, AMPHETAMINE ASPARTATE, DEXTROAMPHETAMINE SULFATE AND AMPHETAMINE SULFATE 7.5; 7.5; 7.5; 7.5 MG/1; MG/1; MG/1; MG/1
30 TABLET ORAL EVERY MORNING
Qty: 30 TABLET | Refills: 0 | Status: SHIPPED | OUTPATIENT
Start: 2024-04-05 | End: 2024-05-05

## 2024-03-05 RX ORDER — DEXTROAMPHETAMINE SACCHARATE, AMPHETAMINE ASPARTATE, DEXTROAMPHETAMINE SULFATE AND AMPHETAMINE SULFATE 5; 5; 5; 5 MG/1; MG/1; MG/1; MG/1
TABLET ORAL
Qty: 45 TABLET | Refills: 0 | Status: SHIPPED | OUTPATIENT
Start: 2024-04-05 | End: 2024-05-29

## 2024-03-05 RX ORDER — DEXTROAMPHETAMINE SACCHARATE, AMPHETAMINE ASPARTATE, DEXTROAMPHETAMINE SULFATE AND AMPHETAMINE SULFATE 7.5; 7.5; 7.5; 7.5 MG/1; MG/1; MG/1; MG/1
30 TABLET ORAL EVERY MORNING
Qty: 30 TABLET | Refills: 0 | Status: SHIPPED | OUTPATIENT
Start: 2024-05-05 | End: 2024-05-29

## 2024-03-05 RX ORDER — POLYETHYLENE GLYCOL 3350 17 G/17G
POWDER, FOR SOLUTION ORAL
COMMUNITY

## 2024-03-05 NOTE — PROGRESS NOTES
Subjective:      Patient ID: Katharine Scott is a 49 y.o. White female      Chief Complaint: Annual Exam (Wellness w/labs)       Past Medical History:   Diagnosis Date    Attention deficit hyperactivity disorder (ADHD) 08/02/2022    Bipolar disorder 08/02/2022    Diabetes mellitus 08/02/2022    GERD (gastroesophageal reflux disease) 09/05/2023    Hyperlipidemia 08/02/2022    Hypothyroidism 08/02/2022    Seasonal allergies 09/05/2023        HPI  Presents to clinic for Annual Wellness exam.    Labs due and ordered  Colorectal cancer screening: States she will call GI for colonoscopy (gastro clinic)  Cervical cancer screening: Declines today; will like to defer to next visit  Breast cancer screening:MMG due and ordered  Osteoporosis screening: Not yet due  Alcohol use: Denies  Tobacco use: Denies  Illicit drug use: Denies  DM eye exam up to date (1/31/2024)    ADHD:  Hx of ADHD and is currently taking Adderall 30 mg at 6 a.m.,  Adderall 20 mg po at 9 a.m., and Adderall 20 mg (1/2 tablet=10 mg) at 2pm. States improved symptoms with current dose.  Denies any ADR.  Needs refills.            Patient Care Team:  Kayla Petersen MD as PCP - General (Family Medicine)      Review of Systems   Constitutional: Negative.  Negative for appetite change, chills and fever.   HENT: Negative.     Eyes: Negative.  Negative for discharge and redness.   Respiratory: Negative.  Negative for shortness of breath.    Cardiovascular: Negative.  Negative for chest pain.   Gastrointestinal: Negative.    Endocrine: Negative.    Genitourinary: Negative.    Integumentary:  Negative.   Allergic/Immunologic: Negative.    Neurological: Negative.    Psychiatric/Behavioral: Negative.     All other systems reviewed and are negative.          Objective:      Vitals:    03/05/24 1502   BP: (!) 140/86   Pulse:    Resp:    Temp:       Body mass index is 37.69 kg/m².     Physical Exam  Vitals reviewed.   Constitutional:       Appearance: Normal  appearance.   HENT:      Head: Normocephalic.      Mouth/Throat:      Mouth: Mucous membranes are moist.   Eyes:      Conjunctiva/sclera: Conjunctivae normal.      Pupils: Pupils are equal, round, and reactive to light.   Cardiovascular:      Rate and Rhythm: Normal rate and regular rhythm.   Pulmonary:      Effort: Pulmonary effort is normal. No respiratory distress.      Breath sounds: Normal breath sounds.   Abdominal:      General: Bowel sounds are normal. There is no distension.      Palpations: Abdomen is soft.   Musculoskeletal:         General: Normal range of motion.      Cervical back: Normal range of motion and neck supple.   Skin:     General: Skin is warm and dry.   Neurological:      Mental Status: She is alert and oriented to person, place, and time.   Psychiatric:         Mood and Affect: Mood normal.            Current Outpatient Medications:     azelastine-fluticasone (DYMISTA) 137-50 mcg/spray Spry nassal spray, 1 spray by Each Nostril route 2 (two) times daily., Disp: 23 g, Rfl: 11    dulaglutide (TRULICITY) 1.5 mg/0.5 mL pen injector, Inject 1.5 mg into the skin every 7 days., Disp: 4 pen , Rfl: 11    fenofibrate (TRICOR) 48 MG tablet, Take 1 tablet (48 mg total) by mouth once daily., Disp: 90 tablet, Rfl: 3    glipiZIDE (GLUCOTROL) 10 MG tablet, Take 2 tablets (20 mg total) by mouth 2 (two) times daily., Disp: 360 tablet, Rfl: 3    levothyroxine (SYNTHROID, LEVOTHROID) 175 MCG tablet, Take 1 tablet (175 mcg total) by mouth once daily. BRAND NAME MEDICALLY NECESSARY, Disp: 90 tablet, Rfl: 3    ondansetron (ZOFRAN) 4 MG tablet, TAKE 1 TABLET (4 MG TOTAL) BY MOUTH 2 (TWO) TIMES DAILY. AS NEEDED FOR NAUSEA, Disp: 15 tablet, Rfl: 1    OXcarbazepine (TRILEPTAL) 600 MG Tab, Take 1 tablet (600 mg total) by mouth once daily., Disp: 90 tablet, Rfl: 3    pantoprazole (PROTONIX) 40 MG tablet, Take 1 tablet (40 mg total) by mouth once daily., Disp: 90 tablet, Rfl: 3    traZODone (DESYREL) 100 MG tablet,  Take 2 tablets (200 mg total) by mouth every evening., Disp: 180 tablet, Rfl: 3    [START ON 5/5/2024] dextroamphetamine-amphetamine (ADDERALL) 20 mg tablet, Take 1 tablet at 9 a.m. and 1/2 tablet at 2pm, Disp: 45 tablet, Rfl: 0    [START ON 4/5/2024] dextroamphetamine-amphetamine (ADDERALL) 20 mg tablet, Take 1 tablet at 9 a.m. and 1/2 tablet at 2pm, Disp: 45 tablet, Rfl: 0    dextroamphetamine-amphetamine (ADDERALL) 20 mg tablet, Take 1 tablet at 9 a.m. and 1/2 tablet at 2pm, Disp: 45 tablet, Rfl: 0    [START ON 5/5/2024] dextroamphetamine-amphetamine (ADDERALL) 30 mg Tab, Take 1 tablet (30 mg total) by mouth every morning., Disp: 30 tablet, Rfl: 0    [START ON 4/5/2024] dextroamphetamine-amphetamine (ADDERALL) 30 mg Tab, Take 1 tablet (30 mg total) by mouth every morning., Disp: 30 tablet, Rfl: 0    dextroamphetamine-amphetamine (ADDERALL) 30 mg Tab, Take 1 tablet (30 mg total) by mouth every morning., Disp: 30 tablet, Rfl: 0    polyethylene glycol (MIRALAX) 17 gram PwPk, 0, Disp: , Rfl:     Assessment & Plan:     Problem List Items Addressed This Visit          Psychiatric    Attention deficit hyperactivity disorder (ADHD)     Stable  Continue Adderall as prescribed (Adderall 30 mg in the 6 morning, adderall 20mg at noon Adderall 10 mg (1/2 of 20mg) at 2pm);  reviewed  Reminded patient this is a controlled medication and must be seen q3 months while on. Patient is agreeable to plan and verbalized understanding   Keep appt with PCP for follow up           Relevant Medications    dextroamphetamine-amphetamine (ADDERALL) 30 mg Tab (Start on 5/5/2024)    dextroamphetamine-amphetamine (ADDERALL) 30 mg Tab (Start on 4/5/2024)    dextroamphetamine-amphetamine (ADDERALL) 30 mg Tab    dextroamphetamine-amphetamine (ADDERALL) 20 mg tablet (Start on 5/5/2024)    dextroamphetamine-amphetamine (ADDERALL) 20 mg tablet (Start on 4/5/2024)    dextroamphetamine-amphetamine (ADDERALL) 20 mg tablet    Other Relevant Orders     Drug Screen, Urine    Bipolar disorder     Stable  Continue current meds (on trileptal and trazodone)  Keep appt with PCP for follow up              Cardiac/Vascular    Hyperlipidemia     Stable  Continue Fenofibrate  Keep appt with PCP for follow up              Endocrine    Diabetes mellitus     Stable  Labs today  Continue Glipizide and Trulicity 1.5mg weekly.   CBGs daily; educated on hypoglycemia and interventions   Keep appt with PCP for follow up           Relevant Orders    CBC Auto Differential    Comprehensive Metabolic Panel    Lipid Panel    TSH    Hemoglobin A1C    Urinalysis    Microalbumin/Creatinine Ratio, Urine    Hypothyroidism     Stable  Continue Levothyroxine  Keep appt with PCP for follow up           Class 2 severe obesity due to excess calories with serious comorbidity and body mass index (BMI) of 36.0 to 36.9 in adult     Educated on aerobic exercise 4-5 times per week x 40 minutes  Healthy eating habits discussed              GI    Constipation, unspecified     Stable  Takes Miralax prn  Keep appt with PCP for follow up         GERD (gastroesophageal reflux disease)     Stable  Continue PPI  Keep appt with PCP for follow up            Other    Wellness examination - Primary     Labs due and ordered  Colorectal cancer screening: States she will call GI for colonoscopy (gastro clinic)  Cervical cancer screening: Declines today; will like to defer to next visit  Breast cancer screening:MMG due and ordered  Osteoporosis screening: Not yet due  Alcohol use: Denies  Tobacco use: Denies  Illicit drug use: Denies  DM eye exam up to date (1/31/2024)         Relevant Orders    CBC Auto Differential    Comprehensive Metabolic Panel    Lipid Panel    TSH    Hemoglobin A1C    Urinalysis    Microalbumin/Creatinine Ratio, Urine     Other Visit Diagnoses       Encounter for screening for malignant neoplasm of breast, unspecified screening modality        Relevant Orders    Mammo Digital Screening Bilat w/  [facility]

## 2024-03-05 NOTE — ASSESSMENT & PLAN NOTE
Stable  Labs today  Continue Glipizide and Trulicity 1.5mg weekly.   CBGs daily; educated on hypoglycemia and interventions   Keep appt with PCP for follow up

## 2024-03-05 NOTE — ASSESSMENT & PLAN NOTE
Labs due and ordered  Colorectal cancer screening: States she will call GI for colonoscopy (gastro clinic)  Cervical cancer screening: Declines today; will like to defer to next visit  Breast cancer screening:MMG due and ordered  Osteoporosis screening: Not yet due  Alcohol use: Denies  Tobacco use: Denies  Illicit drug use: Denies  DM eye exam up to date (1/31/2024)

## 2024-03-05 NOTE — ASSESSMENT & PLAN NOTE
Stable  Continue Adderall as prescribed (Adderall 30 mg in the 6 morning, adderall 20mg at noon Adderall 10 mg (1/2 of 20mg) at 2pm);  reviewed  Reminded patient this is a controlled medication and must be seen q3 months while on. Patient is agreeable to plan and verbalized understanding   Keep appt with PCP for follow up

## 2024-03-26 ENCOUNTER — PATIENT OUTREACH (OUTPATIENT)
Dept: ADMINISTRATIVE | Facility: HOSPITAL | Age: 49
End: 2024-03-26
Payer: COMMERCIAL

## 2024-03-26 NOTE — PROGRESS NOTES
Health Maintenance Topic(s) Outreach Outcomes & Actions Taken:    Colorectal Cancer Screening - Outreach Outcomes & Actions Taken  : No answer    Breast Cancer Screening - Outreach Outcomes & Actions Taken  : No answer. Ordered 3/5/2024    Lab(s) - Outreach Outcomes & Actions Taken  : No answer. Ordered 3/5/2024    Medication Adherence / Statins - Outreach Outcomes & Actions Taken  : Sent Provider Message to Review to Evaluate Pt for Statin, Add Exclusion Dx Codes, Document Exclusion in Problem List, Change Statin Intensity Level to Moderate or High Intensity if Applicable      Additional Notes:  Population Health Chart Review    Controlled? Medication Compliance?   HTN: Blood Pressure Control  Yes  N/A   Diabetes: A1c Control (< 8%)  No      No         Statin Therapy   (CVD or DM diagnosis) Prescribed? Medication Compliance?    No    No              Kidney Health Eval for Pt with DM     uACR eGFR   Date Due 2/23/2024 2/23/2024   Ordered 3/5/2024     Health Maintenance Topics Overdue:     VBHM Score: 5      Colon Cancer Screening  Urine Screening  Hemoglobin A1c  Lipid Panel  Foot Exam        C-scope date? LV with Sid Blandon MD 2/19/2024.    Attempt made to contact patient. No answer. Message left with name and phone number for callback.            Care Management, Digital Medicine, and/or Education Referrals      Next Steps - Referral Actions: Digital Medicine Outcomes and Actions Taken: Pt Declined or Not Eligible

## 2024-05-20 ENCOUNTER — PATIENT OUTREACH (OUTPATIENT)
Dept: ADMINISTRATIVE | Facility: HOSPITAL | Age: 49
End: 2024-05-20
Payer: COMMERCIAL

## 2024-05-20 NOTE — PROGRESS NOTES
5/20/2024- Valuebased outreach review done- Patient has Primary Care appt on 6/10/2024 -labs ordered and patient to call and schedule her colonoscopy.

## 2024-05-29 ENCOUNTER — OFFICE VISIT (OUTPATIENT)
Dept: FAMILY MEDICINE | Facility: CLINIC | Age: 49
End: 2024-05-29
Payer: COMMERCIAL

## 2024-05-29 DIAGNOSIS — E03.9 ACQUIRED HYPOTHYROIDISM: ICD-10-CM

## 2024-05-29 DIAGNOSIS — E11.65 TYPE 2 DIABETES MELLITUS WITH HYPERGLYCEMIA, WITHOUT LONG-TERM CURRENT USE OF INSULIN: ICD-10-CM

## 2024-05-29 DIAGNOSIS — E11.69 HYPERLIPIDEMIA ASSOCIATED WITH TYPE 2 DIABETES MELLITUS: ICD-10-CM

## 2024-05-29 DIAGNOSIS — Z00.00 WELLNESS EXAMINATION: ICD-10-CM

## 2024-05-29 DIAGNOSIS — F90.9 ATTENTION DEFICIT HYPERACTIVITY DISORDER (ADHD), UNSPECIFIED ADHD TYPE: Primary | ICD-10-CM

## 2024-05-29 DIAGNOSIS — E78.5 HYPERLIPIDEMIA ASSOCIATED WITH TYPE 2 DIABETES MELLITUS: ICD-10-CM

## 2024-05-29 PROBLEM — Z23 NEEDS FLU SHOT: Status: RESOLVED | Noted: 2023-12-05 | Resolved: 2024-05-29

## 2024-05-29 PROCEDURE — 2023F DILAT RTA XM W/O RTNOPTHY: CPT | Mod: CPTII,95,, | Performed by: FAMILY MEDICINE

## 2024-05-29 PROCEDURE — 1160F RVW MEDS BY RX/DR IN RCRD: CPT | Mod: CPTII,95,, | Performed by: FAMILY MEDICINE

## 2024-05-29 PROCEDURE — 1159F MED LIST DOCD IN RCRD: CPT | Mod: CPTII,95,, | Performed by: FAMILY MEDICINE

## 2024-05-29 PROCEDURE — 99214 OFFICE O/P EST MOD 30 MIN: CPT | Mod: 95,,, | Performed by: FAMILY MEDICINE

## 2024-05-29 RX ORDER — DEXTROAMPHETAMINE SACCHARATE, AMPHETAMINE ASPARTATE, DEXTROAMPHETAMINE SULFATE AND AMPHETAMINE SULFATE 5; 5; 5; 5 MG/1; MG/1; MG/1; MG/1
TABLET ORAL
Qty: 45 TABLET | Refills: 0 | Status: SHIPPED | OUTPATIENT
Start: 2024-05-29

## 2024-05-29 RX ORDER — DULAGLUTIDE 1.5 MG/.5ML
1.5 INJECTION, SOLUTION SUBCUTANEOUS
Qty: 4 PEN | Refills: 11 | Status: SHIPPED | OUTPATIENT
Start: 2024-05-29 | End: 2025-05-29

## 2024-05-29 RX ORDER — DEXTROAMPHETAMINE SACCHARATE, AMPHETAMINE ASPARTATE, DEXTROAMPHETAMINE SULFATE AND AMPHETAMINE SULFATE 7.5; 7.5; 7.5; 7.5 MG/1; MG/1; MG/1; MG/1
30 TABLET ORAL EVERY MORNING
Qty: 30 TABLET | Refills: 0 | Status: SHIPPED | OUTPATIENT
Start: 2024-05-29 | End: 2024-06-28

## 2024-05-29 NOTE — ASSESSMENT & PLAN NOTE
Lab Results   Component Value Date    TSH 0.548 02/23/2023       Stable on synthroid.  New rx sent in.

## 2024-05-29 NOTE — PROGRESS NOTES
Subjective:        Patient ID: Katharine Scott is a 49 y.o. female.    Chief Complaint: Follow-up (ADHD follow up )      Patient presents to clinic via telemed for adhd follow up. She is due for her wellness visit in February. She is due for labs. States has gone x 2 to do labs and was told did not have orders in chart.       She has ADHD and is on Adderall 30 mg at 6am morning, adderall 20mg at noon and Adderall 10 mg (1/2 of adderall 20mg) at 2pm. needs refills of this. At new job.   Got furloughed.  Asking to fill before end of this month as her insurance will be running out.         The patient also has diabetes and thyroid disease(hypothyroidism) which was previously followed by Dr. Rodriguez. has not seen him since lost insurance. was on radioactive iodine. on synthroid 175mcg daily. tsh 2/2023 wnl.    Has been having oily skin.  Has had weight loss.      She has diabetes.  when she is on trulicity- cbgs 127 average. she is prescribed trulicity 1.5mg weekly and glipizide 10mg (20mg BID). Refills sent in. Following dmii diet. In office eye exam 1/2024.  a1c 8.9 2/2023. Foot exam 2/2023. Urine micro 2/2023     She has hyperlipidemia and is compliant with her medications. On fenofibrate. Refill sent in     She has bipolar/depression and saw psychiatry, Dr. Holly Camarena in the past. on wellbutrin and oxcarbazepine and effexor in past.  Currently on trileptal and trazodone.  Took lexapro- made wired and could not sleep.  Stopped.       Has seasonal allergies and is on dymista. Needs refill.      Has gerd and needs ppi refill      Mammogram 3/2023 was abnormal.  Dx mmg and us 4/2023 recommended biopsy. 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  Ordered.  Has not had colon cancer screening. She would like to schedule colonoscopy.  Referral placed. Cycles regular.  Declines pap  today. Will plan on at next appt.      She is ALLERGIC to aspirin and metformin. She does not drink alcohol or smoke. declines tdap and Shingrix. completed covid series and booster.            The patient location is: car  The chief complaint leading to consultation is: chronic condition follow up    Visit type: audiovisual    Face to Face time with patient: 16 min  25 minutes of total time spent on the encounter, which includes face to face time and non-face to face time preparing to see the patient (eg, review of tests), Obtaining and/or reviewing separately obtained history, Documenting clinical information in the electronic or other health record, Independently interpreting results (not separately reported) and communicating results to the patient/family/caregiver, or Care coordination (not separately reported).         Each patient to whom he or she provides medical services by telemedicine is:  (1) informed of the relationship between the physician and patient and the respective role of any other health care provider with respect to management of the patient; and (2) notified that he or she may decline to receive medical services by telemedicine and may withdraw from such care at any time.    Notes:                Review of Systems   Constitutional:  Negative for activity change.   HENT:  Negative for hearing loss and trouble swallowing.    Eyes:  Negative for discharge.   Respiratory:  Negative for chest tightness and wheezing.    Cardiovascular:  Negative for chest pain and palpitations.   Gastrointestinal:  Negative for constipation, diarrhea and vomiting.   Genitourinary:  Negative for difficulty urinating and hematuria.   Neurological:  Negative for headaches.   Psychiatric/Behavioral:  Negative for dysphoric mood.          Review of patient's allergies indicates:   Allergen Reactions    Aspirin     Metformin      Other reaction(s): makes ill    Morphine     Penicillin Rash      There were no vitals filed  for this visit.   Social History     Socioeconomic History    Marital status:    Occupational History    Occupation: pharmacy   Tobacco Use    Smoking status: Never    Smokeless tobacco: Never   Substance and Sexual Activity    Alcohol use: Not Currently    Drug use: Never    Sexual activity: Yes     Partners: Male     Birth control/protection: None     Social Determinants of Health     Financial Resource Strain: Medium Risk (3/5/2024)    Overall Financial Resource Strain (CARDIA)     Difficulty of Paying Living Expenses: Somewhat hard   Food Insecurity: Food Insecurity Present (3/5/2024)    Hunger Vital Sign     Worried About Running Out of Food in the Last Year: Sometimes true     Ran Out of Food in the Last Year: Sometimes true   Transportation Needs: No Transportation Needs (3/5/2024)    PRAPARE - Transportation     Lack of Transportation (Medical): No     Lack of Transportation (Non-Medical): No   Physical Activity: Insufficiently Active (3/5/2024)    Exercise Vital Sign     Days of Exercise per Week: 3 days     Minutes of Exercise per Session: 10 min   Stress: No Stress Concern Present (3/5/2024)    [employer] - Occupational Stress Questionnaire     Feeling of Stress : Only a little   Housing Stability: Unknown (3/5/2024)    Housing Stability Vital Sign     Unable to Pay for Housing in the Last Year: Patient declined     Number of Places Lived in the Last Year: 1     Unstable Housing in the Last Year: No      No family history on file.       Objective:     Physical Exam  Vitals and nursing note reviewed.   Constitutional:       Appearance: Normal appearance. She is obese.   HENT:      Head: Normocephalic and atraumatic.   Eyes:      Extraocular Movements: Extraocular movements intact.   Skin:     General: Skin is warm and dry.   Neurological:      General: No focal deficit present.      Mental Status: She is alert and oriented to person, place, and time. Mental status is at  baseline.   Psychiatric:         Mood and Affect: Mood normal.       Current Outpatient Medications on File Prior to Visit   Medication Sig Dispense Refill    azelastine-fluticasone (DYMISTA) 137-50 mcg/spray Spry nassal spray 1 spray by Each Nostril route 2 (two) times daily. 23 g 11    fenofibrate (TRICOR) 48 MG tablet Take 1 tablet (48 mg total) by mouth once daily. 90 tablet 3    glipiZIDE (GLUCOTROL) 10 MG tablet Take 2 tablets (20 mg total) by mouth 2 (two) times daily. 360 tablet 3    levothyroxine (SYNTHROID, LEVOTHROID) 175 MCG tablet Take 1 tablet (175 mcg total) by mouth once daily. BRAND NAME MEDICALLY NECESSARY 90 tablet 3    ondansetron (ZOFRAN) 4 MG tablet TAKE 1 TABLET (4 MG TOTAL) BY MOUTH 2 (TWO) TIMES DAILY. AS NEEDED FOR NAUSEA 15 tablet 1    OXcarbazepine (TRILEPTAL) 600 MG Tab Take 1 tablet (600 mg total) by mouth once daily. 90 tablet 3    pantoprazole (PROTONIX) 40 MG tablet Take 1 tablet (40 mg total) by mouth once daily. 90 tablet 3    polyethylene glycol (MIRALAX) 17 gram PwPk 0      traZODone (DESYREL) 100 MG tablet Take 2 tablets (200 mg total) by mouth every evening. 180 tablet 3    [DISCONTINUED] dextroamphetamine-amphetamine (ADDERALL) 20 mg tablet Take 1 tablet at 9 a.m. and 1/2 tablet at 2pm 45 tablet 0    [DISCONTINUED] dextroamphetamine-amphetamine (ADDERALL) 20 mg tablet Take 1 tablet at 9 a.m. and 1/2 tablet at 2pm 45 tablet 0    [DISCONTINUED] dextroamphetamine-amphetamine (ADDERALL) 20 mg tablet Take 1 tablet at 9 a.m. and 1/2 tablet at 2pm 45 tablet 0    [DISCONTINUED] dextroamphetamine-amphetamine (ADDERALL) 30 mg Tab Take 1 tablet (30 mg total) by mouth every morning. 30 tablet 0    [DISCONTINUED] dulaglutide (TRULICITY) 1.5 mg/0.5 mL pen injector Inject 1.5 mg into the skin every 7 days. 4 pen 11     No current facility-administered medications on file prior to visit.     Health Maintenance   Topic Date Due    Low Dose Statin  Never done    Colorectal Cancer Screening   Never done    Hemoglobin A1c  05/23/2023    Foot Exam  02/22/2024    Lipid Panel  02/23/2024    TETANUS VACCINE  05/29/2025 (Originally 3/3/1995)    Mammogram  09/19/2024    Eye Exam  01/31/2025    Hepatitis C Screening  Completed      Results for orders placed or performed in visit on 01/31/24    DIABETES EYE EXAM   Result Value Ref Range    Left Eye DM Retinopathy Negative     Right Eye DM Retinopathy Negative           Assessment & Plan:     Active Problem List with Overview Notes    Diagnosis Date Noted    Wellness examination 03/05/2024    Elevated blood pressure reading 03/05/2024    GERD (gastroesophageal reflux disease) 09/05/2023    Seasonal allergies 09/05/2023    Constipation, unspecified 05/22/2023    Breast cancer screening by mammogram 02/22/2023    Colon cancer screening 02/22/2023    Attention deficit hyperactivity disorder (ADHD) 08/02/2022    Bipolar disorder 08/02/2022    Diabetes mellitus 08/02/2022    Hyperlipidemia associated with type 2 diabetes mellitus 08/02/2022    Hypothyroidism 08/02/2022    Class 2 severe obesity due to excess calories with serious comorbidity and body mass index (BMI) of 36.0 to 36.9 in adult 08/02/2022    Tetanus, diphtheria, and acellular pertussis (Tdap) vaccination declined 08/02/2022       1. Attention deficit hyperactivity disorder (ADHD), unspecified ADHD type  Assessment & Plan:  on Adderall 30 mg in the 6 morning, adderall 20mg at noon Adderall 10 mg (1/2 of 20mg)  at 2pm    [facility] reviewed. Controlled substance policy signed 2/2023. refills sent in as requested. Reminded patient this is a controlled medication and must be seen q3 months while on.  Patient is agreeable to plan and verbalized understanding    WILL NEED TO SIGN CONTROLLED SUBSTANCE POLICY AT NEXT APPT    UDS ordered with wellness labs    Orders:  -     CBC Auto Differential; Future; Expected date: 05/29/2024  -     Comprehensive Metabolic Panel; Future; Expected date: 05/29/2024  -     Lipid  Panel; Future; Expected date: 05/29/2024  -     TSH; Future; Expected date: 05/29/2024  -     Hemoglobin A1C; Future; Expected date: 05/29/2024  -     Urinalysis; Future; Expected date: 05/29/2024  -     Microalbumin/Creatinine Ratio, Urine; Future; Expected date: 05/29/2024  -     Drug Screen, Urine; Future; Expected date: 05/29/2024  -     dextroamphetamine-amphetamine (ADDERALL) 30 mg Tab; Take 1 tablet (30 mg total) by mouth every morning. Ok to fill early on 5/31/24  Dispense: 30 tablet; Refill: 0  -     dextroamphetamine-amphetamine (ADDERALL) 20 mg tablet; Take 1 tablet at 9 a.m. and 1/2 tablet at 2pm Ok to fill early on 5/31/24  Dispense: 45 tablet; Refill: 0    2. Type 2 diabetes mellitus with hyperglycemia, without long-term current use of insulin  Assessment & Plan:  Lab Results   Component Value Date    HGBA1C 8.9 (H) 02/23/2023       Urine micro 2/2023  Foot exam 2/2023  Eye exam in office 1/2024      On glipizide, statin, and trulicity 1.5mg weekly.  Take as directed. Monitor cbgs closely. Contact clinic for concerns.     Orders:  -     CBC Auto Differential; Future; Expected date: 05/29/2024  -     Comprehensive Metabolic Panel; Future; Expected date: 05/29/2024  -     Lipid Panel; Future; Expected date: 05/29/2024  -     TSH; Future; Expected date: 05/29/2024  -     Hemoglobin A1C; Future; Expected date: 05/29/2024  -     Urinalysis; Future; Expected date: 05/29/2024  -     Microalbumin/Creatinine Ratio, Urine; Future; Expected date: 05/29/2024  -     Drug Screen, Urine; Future; Expected date: 05/29/2024    3. Acquired hypothyroidism  Assessment & Plan:  Lab Results   Component Value Date    TSH 0.548 02/23/2023       Stable on synthroid.  New rx sent in.     Orders:  -     CBC Auto Differential; Future; Expected date: 05/29/2024  -     Comprehensive Metabolic Panel; Future; Expected date: 05/29/2024  -     Lipid Panel; Future; Expected date: 05/29/2024  -     TSH; Future; Expected date:  "05/29/2024  -     Hemoglobin A1C; Future; Expected date: 05/29/2024  -     Urinalysis; Future; Expected date: 05/29/2024  -     Microalbumin/Creatinine Ratio, Urine; Future; Expected date: 05/29/2024  -     Drug Screen, Urine; Future; Expected date: 05/29/2024    4. Hyperlipidemia associated with type 2 diabetes mellitus  Assessment & Plan:  On  fenofibrate.     Lab Results   Component Value Date    CHOL 212 (H) 02/23/2023    CHOL 161 06/06/2018    CHOL 170 03/13/2018     Lab Results   Component Value Date    HDL 40 02/23/2023    HDL 36 06/06/2018    HDL 48 03/13/2018     No results found for: "LDLCALC"  Lab Results   Component Value Date    TRIG 178 (H) 02/23/2023    TRIG 238 (H) 06/06/2018    TRIG 244 (H) 03/13/2018       No results found for: "CHOLHDL"      Orders:  -     CBC Auto Differential; Future; Expected date: 05/29/2024  -     Comprehensive Metabolic Panel; Future; Expected date: 05/29/2024  -     Lipid Panel; Future; Expected date: 05/29/2024  -     TSH; Future; Expected date: 05/29/2024  -     Hemoglobin A1C; Future; Expected date: 05/29/2024  -     Urinalysis; Future; Expected date: 05/29/2024  -     Microalbumin/Creatinine Ratio, Urine; Future; Expected date: 05/29/2024  -     Drug Screen, Urine; Future; Expected date: 05/29/2024    5. Wellness examination  -     CBC Auto Differential; Future; Expected date: 05/29/2024  -     Comprehensive Metabolic Panel; Future; Expected date: 05/29/2024  -     Lipid Panel; Future; Expected date: 05/29/2024  -     TSH; Future; Expected date: 05/29/2024  -     Hemoglobin A1C; Future; Expected date: 05/29/2024  -     Urinalysis; Future; Expected date: 05/29/2024  -     Microalbumin/Creatinine Ratio, Urine; Future; Expected date: 05/29/2024  -     Drug Screen, Urine; Future; Expected date: 05/29/2024         Follow up in about 3 months (around 8/29/2024) for ADHD.   "

## 2024-05-29 NOTE — ASSESSMENT & PLAN NOTE
Lab Results   Component Value Date    HGBA1C 8.9 (H) 02/23/2023       Urine micro 2/2023  Foot exam 2/2023  Eye exam in office 1/2024      On glipizide, statin, and trulicity 1.5mg weekly.  Take as directed. Monitor cbgs closely. Contact clinic for concerns.

## 2024-05-29 NOTE — TELEPHONE ENCOUNTER
Pharmacy faxed refill request. Patient requested rx be sent to [facility] mail order pharmacy. Rx printed this morning for signature. Will place in box .

## 2024-05-29 NOTE — ASSESSMENT & PLAN NOTE
"On  fenofibrate.     Lab Results   Component Value Date    CHOL 212 (H) 02/23/2023    CHOL 161 06/06/2018    CHOL 170 03/13/2018     Lab Results   Component Value Date    HDL 40 02/23/2023    HDL 36 06/06/2018    HDL 48 03/13/2018     No results found for: "LDLCALC"  Lab Results   Component Value Date    TRIG 178 (H) 02/23/2023    TRIG 238 (H) 06/06/2018    TRIG 244 (H) 03/13/2018       No results found for: "CHOLHDL"    "

## 2024-06-10 PROBLEM — Z00.00 WELLNESS EXAMINATION: Status: RESOLVED | Noted: 2024-03-05 | Resolved: 2024-06-10

## 2024-07-02 DIAGNOSIS — F90.9 ATTENTION DEFICIT HYPERACTIVITY DISORDER (ADHD), UNSPECIFIED ADHD TYPE: ICD-10-CM

## 2024-07-02 RX ORDER — DEXTROAMPHETAMINE SACCHARATE, AMPHETAMINE ASPARTATE, DEXTROAMPHETAMINE SULFATE AND AMPHETAMINE SULFATE 7.5; 7.5; 7.5; 7.5 MG/1; MG/1; MG/1; MG/1
30 TABLET ORAL EVERY MORNING
Qty: 30 TABLET | Refills: 0 | Status: SHIPPED | OUTPATIENT
Start: 2024-07-02 | End: 2024-08-01

## 2024-07-02 RX ORDER — DEXTROAMPHETAMINE SACCHARATE, AMPHETAMINE ASPARTATE, DEXTROAMPHETAMINE SULFATE AND AMPHETAMINE SULFATE 5; 5; 5; 5 MG/1; MG/1; MG/1; MG/1
TABLET ORAL
Qty: 45 TABLET | Refills: 0 | Status: SHIPPED | OUTPATIENT
Start: 2024-07-02

## 2024-07-02 NOTE — TELEPHONE ENCOUNTER
Lov 5/29/24  Nov 8/29/24    [facility] reviewed. Last fill 6/3/24.  Controlled substance policy signed 3/2023. NEED TO UPDATE AT NEXT IN OFFICE APPT    I have signed for the following orders AND/OR meds.  Please call the patient and ask the patient to schedule the testing AND/OR inform about any medications that were sent.      Medications Ordered This Encounter   Medications    dextroamphetamine-amphetamine (ADDERALL) 20 mg tablet     Sig: Take 1 tablet at 9 a.m. and 1/2 tablet at 2pm     Dispense:  45 tablet     Refill:  0    dextroamphetamine-amphetamine 30 mg Tab     Sig: Take 1 tablet (30 mg total) by mouth every morning.     Dispense:  30 tablet     Refill:  0

## 2024-07-31 ENCOUNTER — PATIENT MESSAGE (OUTPATIENT)
Dept: FAMILY MEDICINE | Facility: CLINIC | Age: 49
End: 2024-07-31
Payer: COMMERCIAL

## 2024-07-31 DIAGNOSIS — F90.9 ATTENTION DEFICIT HYPERACTIVITY DISORDER (ADHD), UNSPECIFIED ADHD TYPE: ICD-10-CM

## 2024-07-31 RX ORDER — DEXTROAMPHETAMINE SACCHARATE, AMPHETAMINE ASPARTATE, DEXTROAMPHETAMINE SULFATE AND AMPHETAMINE SULFATE 5; 5; 5; 5 MG/1; MG/1; MG/1; MG/1
TABLET ORAL
Qty: 45 TABLET | Refills: 0 | Status: SHIPPED | OUTPATIENT
Start: 2024-07-31

## 2024-07-31 RX ORDER — DEXTROAMPHETAMINE SACCHARATE, AMPHETAMINE ASPARTATE, DEXTROAMPHETAMINE SULFATE AND AMPHETAMINE SULFATE 7.5; 7.5; 7.5; 7.5 MG/1; MG/1; MG/1; MG/1
30 TABLET ORAL EVERY MORNING
Qty: 30 TABLET | Refills: 0 | Status: SHIPPED | OUTPATIENT
Start: 2024-07-31 | End: 2024-08-30

## 2024-08-28 ENCOUNTER — TELEPHONE (OUTPATIENT)
Dept: FAMILY MEDICINE | Facility: CLINIC | Age: 49
End: 2024-08-28

## 2024-08-28 ENCOUNTER — LAB VISIT (OUTPATIENT)
Dept: LAB | Facility: HOSPITAL | Age: 49
End: 2024-08-28
Attending: FAMILY MEDICINE

## 2024-08-28 DIAGNOSIS — F90.9 ATTENTION DEFICIT HYPERACTIVITY DISORDER (ADHD), UNSPECIFIED ADHD TYPE: ICD-10-CM

## 2024-08-28 DIAGNOSIS — E11.69 HYPERLIPIDEMIA ASSOCIATED WITH TYPE 2 DIABETES MELLITUS: ICD-10-CM

## 2024-08-28 DIAGNOSIS — E03.9 ACQUIRED HYPOTHYROIDISM: ICD-10-CM

## 2024-08-28 DIAGNOSIS — E78.5 HYPERLIPIDEMIA ASSOCIATED WITH TYPE 2 DIABETES MELLITUS: ICD-10-CM

## 2024-08-28 DIAGNOSIS — E11.65 TYPE 2 DIABETES MELLITUS WITH HYPERGLYCEMIA, WITHOUT LONG-TERM CURRENT USE OF INSULIN: ICD-10-CM

## 2024-08-28 DIAGNOSIS — Z00.00 WELLNESS EXAMINATION: ICD-10-CM

## 2024-08-28 LAB
ALBUMIN SERPL-MCNC: 3.5 G/DL (ref 3.5–5)
ALBUMIN/GLOB SERPL: 0.9 RATIO (ref 1.1–2)
ALP SERPL-CCNC: 82 UNIT/L (ref 40–150)
ALT SERPL-CCNC: 27 UNIT/L (ref 0–55)
AMPHET UR QL SCN: POSITIVE
ANION GAP SERPL CALC-SCNC: 10 MEQ/L
AST SERPL-CCNC: 17 UNIT/L (ref 5–34)
BARBITURATE SCN PRESENT UR: NEGATIVE
BASOPHILS # BLD AUTO: 0.05 X10(3)/MCL
BASOPHILS NFR BLD AUTO: 0.6 %
BENZODIAZ UR QL SCN: NEGATIVE
BILIRUB SERPL-MCNC: 0.2 MG/DL
BUN SERPL-MCNC: 7.7 MG/DL (ref 7–18.7)
CALCIUM SERPL-MCNC: 9.8 MG/DL (ref 8.4–10.2)
CANNABINOIDS UR QL SCN: NEGATIVE
CHLORIDE SERPL-SCNC: 105 MMOL/L (ref 98–107)
CHOLEST SERPL-MCNC: 249 MG/DL
CHOLEST/HDLC SERPL: 7 {RATIO} (ref 0–5)
CO2 SERPL-SCNC: 25 MMOL/L (ref 22–29)
COCAINE UR QL SCN: NEGATIVE
CREAT SERPL-MCNC: 0.86 MG/DL (ref 0.55–1.02)
CREAT UR-MCNC: 225.5 MG/DL (ref 45–106)
CREAT/UREA NIT SERPL: 9
EOSINOPHIL # BLD AUTO: 0.2 X10(3)/MCL (ref 0–0.9)
EOSINOPHIL NFR BLD AUTO: 2.5 %
ERYTHROCYTE [DISTWIDTH] IN BLOOD BY AUTOMATED COUNT: 13.3 % (ref 11.5–17)
EST. AVERAGE GLUCOSE BLD GHB EST-MCNC: 159.9 MG/DL
FENTANYL UR QL SCN: NEGATIVE
GFR SERPLBLD CREATININE-BSD FMLA CKD-EPI: >60 ML/MIN/1.73/M2
GLOBULIN SER-MCNC: 4 GM/DL (ref 2.4–3.5)
GLUCOSE SERPL-MCNC: 231 MG/DL (ref 74–100)
HBA1C MFR BLD: 7.2 %
HCT VFR BLD AUTO: 40.1 % (ref 37–47)
HDLC SERPL-MCNC: 37 MG/DL (ref 35–60)
HGB BLD-MCNC: 13.4 G/DL (ref 12–16)
IMM GRANULOCYTES # BLD AUTO: 0.02 X10(3)/MCL (ref 0–0.04)
IMM GRANULOCYTES NFR BLD AUTO: 0.2 %
LYMPHOCYTES # BLD AUTO: 2.04 X10(3)/MCL (ref 0.6–4.6)
LYMPHOCYTES NFR BLD AUTO: 25.3 %
MCH RBC QN AUTO: 28.9 PG (ref 27–31)
MCHC RBC AUTO-ENTMCNC: 33.4 G/DL (ref 33–36)
MCV RBC AUTO: 86.4 FL (ref 80–94)
MDMA UR QL SCN: NEGATIVE
MICROALBUMIN UR-MCNC: 18.6 UG/ML
MICROALBUMIN/CREAT RATIO PNL UR: 8.2 MG/GM CR (ref 0–30)
MONOCYTES # BLD AUTO: 0.52 X10(3)/MCL (ref 0.1–1.3)
MONOCYTES NFR BLD AUTO: 6.4 %
NEUTROPHILS # BLD AUTO: 5.24 X10(3)/MCL (ref 2.1–9.2)
NEUTROPHILS NFR BLD AUTO: 65 %
NRBC BLD AUTO-RTO: 0 %
OPIATES UR QL SCN: POSITIVE
PCP UR QL: NEGATIVE
PH UR: 6 [PH] (ref 3–11)
PLATELET # BLD AUTO: 305 X10(3)/MCL (ref 130–400)
PMV BLD AUTO: 9.8 FL (ref 7.4–10.4)
POTASSIUM SERPL-SCNC: 4.9 MMOL/L (ref 3.5–5.1)
PROT SERPL-MCNC: 7.5 GM/DL (ref 6.4–8.3)
RBC # BLD AUTO: 4.64 X10(6)/MCL (ref 4.2–5.4)
SODIUM SERPL-SCNC: 140 MMOL/L (ref 136–145)
SPECIFIC GRAVITY, URINE AUTO (.000) (OHS): >1.03 (ref 1–1.03)
TRIGL SERPL-MCNC: 674 MG/DL (ref 37–140)
TSH SERPL-ACNC: 1.7 UIU/ML (ref 0.35–4.94)
WBC # BLD AUTO: 8.07 X10(3)/MCL (ref 4.5–11.5)

## 2024-08-28 PROCEDURE — 36415 COLL VENOUS BLD VENIPUNCTURE: CPT

## 2024-08-28 PROCEDURE — 84443 ASSAY THYROID STIM HORMONE: CPT

## 2024-08-28 PROCEDURE — 85025 COMPLETE CBC W/AUTO DIFF WBC: CPT

## 2024-08-28 PROCEDURE — 80061 LIPID PANEL: CPT

## 2024-08-28 PROCEDURE — 80307 DRUG TEST PRSMV CHEM ANLYZR: CPT

## 2024-08-28 PROCEDURE — 83036 HEMOGLOBIN GLYCOSYLATED A1C: CPT

## 2024-08-28 PROCEDURE — 80053 COMPREHEN METABOLIC PANEL: CPT

## 2024-08-28 PROCEDURE — 82570 ASSAY OF URINE CREATININE: CPT

## 2024-08-29 ENCOUNTER — OFFICE VISIT (OUTPATIENT)
Dept: FAMILY MEDICINE | Facility: CLINIC | Age: 49
End: 2024-08-29

## 2024-08-29 VITALS
DIASTOLIC BLOOD PRESSURE: 88 MMHG | WEIGHT: 230.19 LBS | BODY MASS INDEX: 38.35 KG/M2 | OXYGEN SATURATION: 98 % | HEART RATE: 91 BPM | RESPIRATION RATE: 16 BRPM | HEIGHT: 65 IN | TEMPERATURE: 98 F | SYSTOLIC BLOOD PRESSURE: 134 MMHG

## 2024-08-29 DIAGNOSIS — K21.9 GASTROESOPHAGEAL REFLUX DISEASE, UNSPECIFIED WHETHER ESOPHAGITIS PRESENT: ICD-10-CM

## 2024-08-29 DIAGNOSIS — F31.9 BIPOLAR AFFECTIVE DISORDER, REMISSION STATUS UNSPECIFIED: ICD-10-CM

## 2024-08-29 DIAGNOSIS — J30.2 SEASONAL ALLERGIES: ICD-10-CM

## 2024-08-29 DIAGNOSIS — E66.01 CLASS 2 SEVERE OBESITY DUE TO EXCESS CALORIES WITH SERIOUS COMORBIDITY AND BODY MASS INDEX (BMI) OF 38.0 TO 38.9 IN ADULT: ICD-10-CM

## 2024-08-29 DIAGNOSIS — E11.69 HYPERLIPIDEMIA ASSOCIATED WITH TYPE 2 DIABETES MELLITUS: ICD-10-CM

## 2024-08-29 DIAGNOSIS — F90.9 ATTENTION DEFICIT HYPERACTIVITY DISORDER (ADHD), UNSPECIFIED ADHD TYPE: ICD-10-CM

## 2024-08-29 DIAGNOSIS — E78.5 HYPERLIPIDEMIA ASSOCIATED WITH TYPE 2 DIABETES MELLITUS: ICD-10-CM

## 2024-08-29 DIAGNOSIS — E11.65 TYPE 2 DIABETES MELLITUS WITH HYPERGLYCEMIA, WITHOUT LONG-TERM CURRENT USE OF INSULIN: Primary | ICD-10-CM

## 2024-08-29 DIAGNOSIS — E03.9 HYPOTHYROIDISM, UNSPECIFIED TYPE: ICD-10-CM

## 2024-08-29 PROCEDURE — 99213 OFFICE O/P EST LOW 20 MIN: CPT | Mod: ,,, | Performed by: FAMILY MEDICINE

## 2024-08-29 RX ORDER — TRAZODONE HYDROCHLORIDE 100 MG/1
200 TABLET ORAL NIGHTLY
Qty: 180 TABLET | Refills: 3 | Status: SHIPPED | OUTPATIENT
Start: 2024-08-29 | End: 2025-08-29

## 2024-08-29 RX ORDER — DEXTROAMPHETAMINE SACCHARATE, AMPHETAMINE ASPARTATE, DEXTROAMPHETAMINE SULFATE AND AMPHETAMINE SULFATE 7.5; 7.5; 7.5; 7.5 MG/1; MG/1; MG/1; MG/1
30 TABLET ORAL EVERY MORNING
Qty: 30 TABLET | Refills: 0 | Status: SHIPPED | OUTPATIENT
Start: 2024-09-29 | End: 2024-10-29

## 2024-08-29 RX ORDER — DEXTROAMPHETAMINE SACCHARATE, AMPHETAMINE ASPARTATE, DEXTROAMPHETAMINE SULFATE AND AMPHETAMINE SULFATE 5; 5; 5; 5 MG/1; MG/1; MG/1; MG/1
TABLET ORAL
Qty: 45 TABLET | Refills: 0 | Status: SHIPPED | OUTPATIENT
Start: 2024-10-29

## 2024-08-29 RX ORDER — PANTOPRAZOLE SODIUM 40 MG/1
40 TABLET, DELAYED RELEASE ORAL DAILY
Qty: 90 TABLET | Refills: 3 | Status: SHIPPED | OUTPATIENT
Start: 2024-08-29 | End: 2025-08-29

## 2024-08-29 RX ORDER — DEXTROAMPHETAMINE SACCHARATE, AMPHETAMINE ASPARTATE, DEXTROAMPHETAMINE SULFATE AND AMPHETAMINE SULFATE 5; 5; 5; 5 MG/1; MG/1; MG/1; MG/1
TABLET ORAL
Qty: 45 TABLET | Refills: 0 | Status: SHIPPED | OUTPATIENT
Start: 2024-09-29

## 2024-08-29 RX ORDER — DEXTROAMPHETAMINE SACCHARATE, AMPHETAMINE ASPARTATE, DEXTROAMPHETAMINE SULFATE AND AMPHETAMINE SULFATE 7.5; 7.5; 7.5; 7.5 MG/1; MG/1; MG/1; MG/1
30 TABLET ORAL EVERY MORNING
Qty: 30 TABLET | Refills: 0 | Status: SHIPPED | OUTPATIENT
Start: 2024-10-29 | End: 2024-11-28

## 2024-08-29 RX ORDER — LEVOTHYROXINE SODIUM 175 UG/1
175 TABLET ORAL DAILY
Qty: 90 TABLET | Refills: 3 | Status: SHIPPED | OUTPATIENT
Start: 2024-08-29 | End: 2025-08-29

## 2024-08-29 RX ORDER — AZELASTINE HYDROCHLORIDE, FLUTICASONE PROPIONATE 137; 50 UG/1; UG/1
1 SPRAY, METERED NASAL 2 TIMES DAILY
Qty: 23 G | Refills: 11 | Status: SHIPPED | OUTPATIENT
Start: 2024-08-29

## 2024-08-29 RX ORDER — GLIPIZIDE 10 MG/1
20 TABLET ORAL 2 TIMES DAILY
Qty: 360 TABLET | Refills: 3 | Status: SHIPPED | OUTPATIENT
Start: 2024-08-29 | End: 2025-08-29

## 2024-08-29 RX ORDER — DEXTROAMPHETAMINE SACCHARATE, AMPHETAMINE ASPARTATE, DEXTROAMPHETAMINE SULFATE AND AMPHETAMINE SULFATE 7.5; 7.5; 7.5; 7.5 MG/1; MG/1; MG/1; MG/1
30 TABLET ORAL EVERY MORNING
Qty: 30 TABLET | Refills: 0 | Status: SHIPPED | OUTPATIENT
Start: 2024-08-30 | End: 2024-09-29

## 2024-08-29 RX ORDER — FENOFIBRATE 48 MG/1
48 TABLET, FILM COATED ORAL DAILY
Qty: 90 TABLET | Refills: 3 | Status: SHIPPED | OUTPATIENT
Start: 2024-08-29 | End: 2025-08-29

## 2024-08-29 RX ORDER — DEXTROAMPHETAMINE SACCHARATE, AMPHETAMINE ASPARTATE, DEXTROAMPHETAMINE SULFATE AND AMPHETAMINE SULFATE 5; 5; 5; 5 MG/1; MG/1; MG/1; MG/1
TABLET ORAL
Qty: 45 TABLET | Refills: 0 | Status: SHIPPED | OUTPATIENT
Start: 2024-08-30

## 2024-08-29 RX ORDER — OXCARBAZEPINE 600 MG/1
600 TABLET, FILM COATED ORAL DAILY
Qty: 90 TABLET | Refills: 3 | Status: SHIPPED | OUTPATIENT
Start: 2024-08-29

## 2024-08-29 NOTE — ASSESSMENT & PLAN NOTE
on Adderall 30 mg in the 6 morning, adderall 20mg at noon Adderall 10 mg (1/2 of 20mg)  at 2pm    [facility] reviewed. Controlled substance policy signed TODAY refills sent in as requested. Reminded patient this is a controlled medication and must be seen q3 months while on.  Patient is agreeable to plan and verbalized understanding    UDS 8/2024 appropriately positive

## 2024-08-29 NOTE — PROGRESS NOTES
Subjective:        Patient ID: Katharine Scott is a 49 y.o. female.    Chief Complaint: Follow-up (ADHD follow up /Left knee pain and swelling )      Patient presents to clinic via telemed for adhd follow up. She is due for her wellness visit in march. She did labs prior to her appt and it was reviewed with her at time of appt.      She has ADHD and is on Adderall 30 mg at 6am morning, adderall 20mg at noon and Adderall 10 mg (1/2 of adderall 20mg) at 2pm. needs refills of this. At new job. Does not have insurance with them since is part time.    UDS was appropriatley positive 8/2024. Also showed opiates- was on pain med from [facility] for knee pain.   Having to walk more and up and down stairs at new job. No trauma or injury. Having some swelling/puffiness.      The patient also has diabetes and thyroid disease(hypothyroidism) which was previously followed by Dr. Rodriguez. has not seen him since lost insurance. was on radioactive iodine. on synthroid 175mcg daily. tsh 8/2024 wnl.       She has diabetes.  when she is on trulicity- cbgs 127 average. she is prescribed trulicity 1.5mg weekly and glipizide 10mg (20mg BID). Following dmii diet. But has been eating cookies at night after work. In office eye exam 1/2024.  a1c 7.2 8/2024 Foot exam today. Urine micro 8/2024     She has hyperlipidemia and is compliant with her medications. On fenofibrate. Ran out.  Lipids 8/2024 worse than previous.  Will send in refill. Start taking as directed again.      She has bipolar/depression and saw psychiatry, Dr. Holly Camarena in the past. on wellbutrin and oxcarbazepine and effexor in past.  Currently on trileptal and trazodone.  Took lexapro- made wired and could not sleep.  Stopped.       Has seasonal allergies and is on dymista.      Has gerd. Doing well on ppi.    She is obese.      Mammogram 3/2023 was abnormal.  Dx mmg and us 4/2023 recommended biopsy. 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  Ordered.  Has not had colon cancer screening. She would like to schedule colonoscopy.  Referral placed. Cycles regular.  Declines pap today. Will plan on at next appt.      She is ALLERGIC to aspirin and metformin. She does not drink alcohol or smoke. declines tdap and Shingrix. completed covid series and booster.              Review of Systems   Constitutional: Negative.    HENT: Negative.     Respiratory: Negative.     Cardiovascular: Negative.    Gastrointestinal: Negative.    Genitourinary: Negative.    Musculoskeletal:  Positive for arthralgias and joint swelling.         Review of patient's allergies indicates:   Allergen Reactions    Aspirin     Metformin      Other reaction(s): makes ill    Morphine     Penicillin Rash      Vitals:    08/29/24 1315   BP: 134/88   BP Location: Left arm   Pulse: 91   Resp: 16   Temp: 98.3 °F (36.8 °C)   TempSrc: Temporal   SpO2: 98%   Weight: 104.4 kg (230 lb 3.2 oz)   Height: 5' 5" (1.651 m)      Social History     Socioeconomic History    Marital status:    Occupational History    Occupation: pharmacy   Tobacco Use    Smoking status: Never    Smokeless tobacco: Never   Substance and Sexual Activity    Alcohol use: Not Currently    Drug use: Never    Sexual activity: Yes     Partners: Male     Birth control/protection: None     Social Determinants of Health     Financial Resource Strain: Medium Risk (3/5/2024)    Overall Financial Resource Strain (CARDIA)     Difficulty of Paying Living Expenses: Somewhat hard   Food Insecurity: Food Insecurity Present (3/5/2024)    Hunger Vital Sign     Worried About Running Out of Food in the Last Year: Sometimes true     Ran Out of Food in the Last Year: Sometimes true   Transportation Needs: No Transportation Needs (3/5/2024)    PRAPARE - Transportation     Lack of Transportation (Medical): No     Lack of Transportation " (Non-Medical): No   Physical Activity: Insufficiently Active (3/5/2024)    Exercise Vital Sign     Days of Exercise per Week: 3 days     Minutes of Exercise per Session: 10 min   Stress: No Stress Concern Present (3/5/2024)    [employer] - Occupational Stress Questionnaire     Feeling of Stress : Only a little   Housing Stability: Unknown (3/5/2024)    Housing Stability Vital Sign     Unable to Pay for Housing in the Last Year: Patient declined     Number of Places Lived in the Last Year: 1     Unstable Housing in the Last Year: No      No family history on file.       Objective:     Physical Exam  Vitals and nursing note reviewed.   Constitutional:       Appearance: Normal appearance. She is obese.   HENT:      Head: Normocephalic and atraumatic.      Nose: Nose normal.      Mouth/Throat:      Mouth: Mucous membranes are moist.      Pharynx: Oropharynx is clear.   Eyes:      Extraocular Movements: Extraocular movements intact.   Cardiovascular:      Rate and Rhythm: Normal rate and regular rhythm.      Pulses: Normal pulses.           Dorsalis pedis pulses are 2+ on the right side and 2+ on the left side.        Posterior tibial pulses are 2+ on the right side and 2+ on the left side.      Heart sounds: Normal heart sounds.   Pulmonary:      Effort: Pulmonary effort is normal.      Breath sounds: Normal breath sounds.   Musculoskeletal:         General: Normal range of motion.      Cervical back: Normal range of motion.        Feet:       Comments: Ambulates unassisted.    Feet:      Right foot:      Protective Sensation: 8 sites tested.  8 sites sensed.      Skin integrity: Skin integrity normal.      Left foot:      Protective Sensation: 8 sites tested.  8 sites sensed.      Skin integrity: Skin integrity normal.   Skin:     General: Skin is warm and dry.   Neurological:      General: No focal deficit present.      Mental Status: She is alert and oriented to person, place, and time.  Mental status is at baseline.   Psychiatric:         Mood and Affect: Mood normal.       Current Outpatient Medications on File Prior to Visit   Medication Sig Dispense Refill    dulaglutide (TRULICITY) 1.5 mg/0.5 mL pen injector Inject 1.5 mg into the skin every 7 days. 4 pen 11    ondansetron (ZOFRAN) 4 MG tablet TAKE 1 TABLET (4 MG TOTAL) BY MOUTH 2 (TWO) TIMES DAILY. AS NEEDED FOR NAUSEA 15 tablet 1    polyethylene glycol (MIRALAX) 17 gram PwPk 0       No current facility-administered medications on file prior to visit.     Health Maintenance   Topic Date Due    Low Dose Statin  Never done    Colorectal Cancer Screening  Never done    Mammogram  09/19/2024    TETANUS VACCINE  05/29/2025 (Originally 3/3/1993)    Hemoglobin A1c  11/28/2024    Eye Exam  01/31/2025    Lipid Panel  08/28/2025    Foot Exam  08/29/2025    Hepatitis C Screening  Completed      Results for orders placed or performed in visit on 08/28/24   Comprehensive Metabolic Panel   Result Value Ref Range    Sodium 140 136 - 145 mmol/L    Potassium 4.9 3.5 - 5.1 mmol/L    Chloride 105 98 - 107 mmol/L    CO2 25 22 - 29 mmol/L    Glucose 231 (H) 74 - 100 mg/dL    Blood Urea Nitrogen 7.7 7.0 - 18.7 mg/dL    Creatinine 0.86 0.55 - 1.02 mg/dL    Calcium 9.8 8.4 - 10.2 mg/dL    Protein Total 7.5 6.4 - 8.3 gm/dL    Albumin 3.5 3.5 - 5.0 g/dL    Globulin 4.0 (H) 2.4 - 3.5 gm/dL    Albumin/Globulin Ratio 0.9 (L) 1.1 - 2.0 ratio    Bilirubin Total 0.2 <=1.5 mg/dL    ALP 82 40 - 150 unit/L    ALT 27 0 - 55 unit/L    AST 17 5 - 34 unit/L    eGFR >60 mL/min/1.73/m2    Anion Gap 10.0 mEq/L    BUN/Creatinine Ratio 9    Lipid Panel   Result Value Ref Range    Cholesterol Total 249 (H) <=200 mg/dL    HDL Cholesterol 37 35 - 60 mg/dL    Triglyceride 674 (H) 37 - 140 mg/dL    Cholesterol/HDL Ratio 7 (H) 0 - 5   TSH   Result Value Ref Range    TSH 1.699 0.350 - 4.940 uIU/mL   Hemoglobin A1C   Result Value Ref Range    Hemoglobin A1c 7.2 (H) <=7.0 %    Estimated Average  Glucose 159.9 mg/dL   Microalbumin/Creatinine Ratio, Urine   Result Value Ref Range    Urine Microalbumin 18.6 <=30.0 ug/mL    Urine Creatinine 225.5 (H) 45.0 - 106.0 mg/dL    Microalbumin Creatinine Ratio 8.2 0.0 - 30.0 mg/gm Cr   Drug Screen, Urine   Result Value Ref Range    Amphetamines, Urine Positive (A) Negative    Barbiturates, Urine Negative Negative    Benzodiazepine, Urine Negative Negative    Cannabinoids, Urine Negative Negative    Cocaine, Urine Negative Negative    Fentanyl, Urine Negative Negative    MDMA, Urine Negative Negative    Opiates, Urine Positive (A) Negative    Phencyclidine, Urine Negative Negative    pH, Urine 6.0 3.0 - 11.0    Specific Gravity, Urine Auto >1.030 1.001 - 1.035   CBC with Differential   Result Value Ref Range    WBC 8.07 4.50 - 11.50 x10(3)/mcL    RBC 4.64 4.20 - 5.40 x10(6)/mcL    Hgb 13.4 12.0 - 16.0 g/dL    Hct 40.1 37.0 - 47.0 %    MCV 86.4 80.0 - 94.0 fL    MCH 28.9 27.0 - 31.0 pg    MCHC 33.4 33.0 - 36.0 g/dL    RDW 13.3 11.5 - 17.0 %    Platelet 305 130 - 400 x10(3)/mcL    MPV 9.8 7.4 - 10.4 fL    Neut % 65.0 %    Lymph % 25.3 %    Mono % 6.4 %    Eos % 2.5 %    Basophil % 0.6 %    Lymph # 2.04 0.6 - 4.6 x10(3)/mcL    Neut # 5.24 2.1 - 9.2 x10(3)/mcL    Mono # 0.52 0.1 - 1.3 x10(3)/mcL    Eos # 0.20 0 - 0.9 x10(3)/mcL    Baso # 0.05 <=0.2 x10(3)/mcL    IG# 0.02 0 - 0.04 x10(3)/mcL    IG% 0.2 %    NRBC% 0.0 %          Assessment & Plan:     Active Problem List with Overview Notes    Diagnosis Date Noted    Elevated blood pressure reading 03/05/2024    GERD (gastroesophageal reflux disease) 09/05/2023    Seasonal allergies 09/05/2023    Constipation, unspecified 05/22/2023    Breast cancer screening by mammogram 02/22/2023    Colon cancer screening 02/22/2023    Attention deficit hyperactivity disorder (ADHD) 08/02/2022    Bipolar disorder 08/02/2022    Diabetes mellitus 08/02/2022    Hyperlipidemia associated with type 2 diabetes mellitus 08/02/2022     "Hypothyroidism 08/02/2022    Class 2 severe obesity due to excess calories with serious comorbidity and body mass index (BMI) of 38.0 to 38.9 in adult 08/02/2022    Tetanus, diphtheria, and acellular pertussis (Tdap) vaccination declined 08/02/2022       1. Type 2 diabetes mellitus with hyperglycemia, without long-term current use of insulin  Assessment & Plan:  Lab Results   Component Value Date    HGBA1C 7.2 (H) 08/28/2024       Urine micro 8/2024  Foot exam today  Eye exam in office 1/2024      On glipizide, statin, and trulicity 1.5mg weekly.  Take as directed. Monitor cbgs closely. Contact clinic for concerns.     Orders:  -     glipiZIDE (GLUCOTROL) 10 MG tablet; Take 2 tablets (20 mg total) by mouth 2 (two) times daily.  Dispense: 360 tablet; Refill: 3  -     Comprehensive Metabolic Panel; Future; Expected date: 11/29/2024  -     Lipid Panel; Future; Expected date: 11/29/2024  -     Hemoglobin A1C; Future; Expected date: 11/29/2024    2. Hyperlipidemia associated with type 2 diabetes mellitus  Assessment & Plan:  On  fenofibrate. Has been out. Likely cause of elevated lipid.  Refill sent in.     Lab Results   Component Value Date    CHOL 249 (H) 08/28/2024    CHOL 212 (H) 02/23/2023    CHOL 161 06/06/2018     Lab Results   Component Value Date    HDL 37 08/28/2024    HDL 40 02/23/2023    HDL 36 06/06/2018     No results found for: "LDLCALC"  Lab Results   Component Value Date    TRIG 674 (H) 08/28/2024    TRIG 178 (H) 02/23/2023    TRIG 238 (H) 06/06/2018       No results found for: "CHOLHDL"      Orders:  -     fenofibrate (TRICOR) 48 MG tablet; Take 1 tablet (48 mg total) by mouth once daily. For cholesterol  Dispense: 90 tablet; Refill: 3    3. Attention deficit hyperactivity disorder (ADHD), unspecified ADHD type  Assessment & Plan:  on Adderall 30 mg in the 6 morning, adderall 20mg at noon Adderall 10 mg (1/2 of 20mg)  at 2pm    [employer] reviewed. Controlled substance policy signed TODAY refills sent in " as requested. Reminded patient this is a controlled medication and must be seen q3 months while on.  Patient is agreeable to plan and verbalized understanding    UDS 8/2024 appropriately positive    Orders:  -     dextroamphetamine-amphetamine 30 mg Tab; Take 1 tablet (30 mg total) by mouth every morning.  Dispense: 30 tablet; Refill: 0  -     dextroamphetamine-amphetamine 30 mg Tab; Take 1 tablet (30 mg total) by mouth every morning.  Dispense: 30 tablet; Refill: 0  -     dextroamphetamine-amphetamine 30 mg Tab; Take 1 tablet (30 mg total) by mouth every morning.  Dispense: 30 tablet; Refill: 0  -     dextroamphetamine-amphetamine (ADDERALL) 20 mg tablet; Take 1 tablet at 9 a.m. and 1/2 tablet at 2pm  Dispense: 45 tablet; Refill: 0  -     dextroamphetamine-amphetamine (ADDERALL) 20 mg tablet; Take 1 tablet at 9 a.m. and 1/2 tablet at 2pm  Dispense: 45 tablet; Refill: 0  -     dextroamphetamine-amphetamine (ADDERALL) 20 mg tablet; Take 1 tablet at 9 a.m. and 1/2 tablet at 2pm  Dispense: 45 tablet; Refill: 0  -     Comprehensive Metabolic Panel; Future; Expected date: 11/29/2024  -     Lipid Panel; Future; Expected date: 11/29/2024  -     Hemoglobin A1C; Future; Expected date: 11/29/2024    4. Bipolar affective disorder, remission status unspecified  Assessment & Plan:  Stable on trileptal and trazodone.  Will contact clinic for concerns. Patient is agreeable to plan and verbalized understanding    Orders:  -     traZODone (DESYREL) 100 MG tablet; Take 2 tablets (200 mg total) by mouth every evening.  Dispense: 180 tablet; Refill: 3  -     OXcarbazepine (TRILEPTAL) 600 MG Tab; Take 1 tablet (600 mg total) by mouth once daily.  Dispense: 90 tablet; Refill: 3  -     Comprehensive Metabolic Panel; Future; Expected date: 11/29/2024  -     Lipid Panel; Future; Expected date: 11/29/2024  -     Hemoglobin A1C; Future; Expected date: 11/29/2024    5. Gastroesophageal reflux disease, unspecified whether esophagitis  present  Assessment & Plan:  Stable on ppi.       Orders:  -     pantoprazole (PROTONIX) 40 MG tablet; Take 1 tablet (40 mg total) by mouth once daily.  Dispense: 90 tablet; Refill: 3  -     Comprehensive Metabolic Panel; Future; Expected date: 11/29/2024  -     Lipid Panel; Future; Expected date: 11/29/2024  -     Hemoglobin A1C; Future; Expected date: 11/29/2024    6. Hypothyroidism, unspecified type  Assessment & Plan:  Lab Results   Component Value Date    TSH 1.699 08/28/2024       Stable on synthroid.      Orders:  -     levothyroxine (SYNTHROID, LEVOTHROID) 175 MCG tablet; Take 1 tablet (175 mcg total) by mouth once daily. BRAND NAME MEDICALLY NECESSARY  Dispense: 90 tablet; Refill: 3  -     Comprehensive Metabolic Panel; Future; Expected date: 11/29/2024  -     Lipid Panel; Future; Expected date: 11/29/2024  -     Hemoglobin A1C; Future; Expected date: 11/29/2024    7. Seasonal allergies  Assessment & Plan:  Stable on dymista     Orders:  -     azelastine-fluticasone (DYMISTA) 137-50 mcg/spray Spry nassal spray; 1 spray by Each Nostril route 2 (two) times daily.  Dispense: 23 g; Refill: 11  -     Comprehensive Metabolic Panel; Future; Expected date: 11/29/2024  -     Lipid Panel; Future; Expected date: 11/29/2024  -     Hemoglobin A1C; Future; Expected date: 11/29/2024    8. Class 2 severe obesity due to excess calories with serious comorbidity and body mass index (BMI) of 38.0 to 38.9 in adult  Assessment & Plan:  Encouraged lifestyle change           Follow up in about 3 months (around 11/29/2024) for ADHD virtual visit.

## 2024-08-30 NOTE — ASSESSMENT & PLAN NOTE
Lab Results   Component Value Date    HGBA1C 7.2 (H) 08/28/2024       Urine micro 8/2024  Foot exam today  Eye exam in office 1/2024      On glipizide, statin, and trulicity 1.5mg weekly.  Take as directed. Monitor cbgs closely. Contact clinic for concerns.

## 2024-08-30 NOTE — ASSESSMENT & PLAN NOTE
"On  fenofibrate. Has been out. Likely cause of elevated lipid.  Refill sent in.     Lab Results   Component Value Date    CHOL 249 (H) 08/28/2024    CHOL 212 (H) 02/23/2023    CHOL 161 06/06/2018     Lab Results   Component Value Date    HDL 37 08/28/2024    HDL 40 02/23/2023    HDL 36 06/06/2018     No results found for: "LDLCALC"  Lab Results   Component Value Date    TRIG 674 (H) 08/28/2024    TRIG 178 (H) 02/23/2023    TRIG 238 (H) 06/06/2018       No results found for: "CHOLHDL"    " Please be informed that if you contact our office outside of normal business hours the physician on call cannot help with any scheduling or rescheduling issues, procedure instruction questions or any type of medication issue. We advise you for any urgent/emergency that you go to the nearest emergency room! PLEASE CALL OUR OFFICE DURING NORMAL BUSINESS HOURS    Monday - Friday   8 am to 5 pm    [address]: [phone]    [place]:  [phone]        Thank you for allowing us to care for you today! We want to ensure we can follow your treatment plan and we strive to give you the best outcomes and experience possible. If you ever have a life threatening emergency and call 911 - for an ambulance (EMS)   Our providers can only care for you at:   [facility] or [facility]. Even if you have someone take you or you drive yourself we can only care for you in a [facility] facility. Our providers are not setup at the other healthcare locations! **It is YOUR responsibilty to bring medication bottles and/or updated medication list to [address] [place] [address].  This will allow us to better serve you and all your healthcare needs**

## 2024-11-25 ENCOUNTER — TELEPHONE (OUTPATIENT)
Dept: FAMILY MEDICINE | Facility: CLINIC | Age: 49
End: 2024-11-25

## 2024-11-25 ENCOUNTER — OFFICE VISIT (OUTPATIENT)
Dept: FAMILY MEDICINE | Facility: CLINIC | Age: 49
End: 2024-11-25

## 2024-11-25 VITALS
HEIGHT: 65 IN | HEART RATE: 84 BPM | RESPIRATION RATE: 18 BRPM | TEMPERATURE: 98 F | WEIGHT: 226.38 LBS | SYSTOLIC BLOOD PRESSURE: 118 MMHG | BODY MASS INDEX: 37.72 KG/M2 | OXYGEN SATURATION: 97 % | DIASTOLIC BLOOD PRESSURE: 78 MMHG

## 2024-11-25 DIAGNOSIS — E11.69 HYPERLIPIDEMIA ASSOCIATED WITH TYPE 2 DIABETES MELLITUS: ICD-10-CM

## 2024-11-25 DIAGNOSIS — E11.65 TYPE 2 DIABETES MELLITUS WITH HYPERGLYCEMIA, WITHOUT LONG-TERM CURRENT USE OF INSULIN: ICD-10-CM

## 2024-11-25 DIAGNOSIS — F31.9 BIPOLAR AFFECTIVE DISORDER, REMISSION STATUS UNSPECIFIED: ICD-10-CM

## 2024-11-25 DIAGNOSIS — E03.9 ACQUIRED HYPOTHYROIDISM: ICD-10-CM

## 2024-11-25 DIAGNOSIS — E66.812 CLASS 2 SEVERE OBESITY DUE TO EXCESS CALORIES WITH SERIOUS COMORBIDITY AND BODY MASS INDEX (BMI) OF 37.0 TO 37.9 IN ADULT: ICD-10-CM

## 2024-11-25 DIAGNOSIS — E78.5 HYPERLIPIDEMIA ASSOCIATED WITH TYPE 2 DIABETES MELLITUS: ICD-10-CM

## 2024-11-25 DIAGNOSIS — F90.9 ATTENTION DEFICIT HYPERACTIVITY DISORDER (ADHD), UNSPECIFIED ADHD TYPE: Primary | ICD-10-CM

## 2024-11-25 DIAGNOSIS — E66.01 CLASS 2 SEVERE OBESITY DUE TO EXCESS CALORIES WITH SERIOUS COMORBIDITY AND BODY MASS INDEX (BMI) OF 37.0 TO 37.9 IN ADULT: ICD-10-CM

## 2024-11-25 PROCEDURE — 99213 OFFICE O/P EST LOW 20 MIN: CPT | Mod: ,,, | Performed by: FAMILY MEDICINE

## 2024-11-25 RX ORDER — DEXTROAMPHETAMINE SACCHARATE, AMPHETAMINE ASPARTATE, DEXTROAMPHETAMINE SULFATE AND AMPHETAMINE SULFATE 5; 5; 5; 5 MG/1; MG/1; MG/1; MG/1
TABLET ORAL
Qty: 45 TABLET | Refills: 0 | Status: SHIPPED | OUTPATIENT
Start: 2024-12-26

## 2024-11-25 RX ORDER — DEXTROAMPHETAMINE SACCHARATE, AMPHETAMINE ASPARTATE, DEXTROAMPHETAMINE SULFATE AND AMPHETAMINE SULFATE 5; 5; 5; 5 MG/1; MG/1; MG/1; MG/1
TABLET ORAL
Qty: 45 TABLET | Refills: 0 | Status: SHIPPED | OUTPATIENT
Start: 2025-01-25

## 2024-11-25 RX ORDER — DEXTROAMPHETAMINE SACCHARATE, AMPHETAMINE ASPARTATE, DEXTROAMPHETAMINE SULFATE AND AMPHETAMINE SULFATE 7.5; 7.5; 7.5; 7.5 MG/1; MG/1; MG/1; MG/1
30 TABLET ORAL EVERY MORNING
Qty: 30 TABLET | Refills: 0 | Status: SHIPPED | OUTPATIENT
Start: 2024-11-25 | End: 2024-12-25

## 2024-11-25 RX ORDER — DEXTROAMPHETAMINE SACCHARATE, AMPHETAMINE ASPARTATE, DEXTROAMPHETAMINE SULFATE AND AMPHETAMINE SULFATE 5; 5; 5; 5 MG/1; MG/1; MG/1; MG/1
TABLET ORAL
Qty: 45 TABLET | Refills: 0 | Status: SHIPPED | OUTPATIENT
Start: 2024-11-25

## 2024-11-25 RX ORDER — SEMAGLUTIDE 0.68 MG/ML
0.25 INJECTION, SOLUTION SUBCUTANEOUS
Qty: 3 ML | Refills: 5 | Status: SHIPPED | OUTPATIENT
Start: 2024-11-25

## 2024-11-25 RX ORDER — DEXTROAMPHETAMINE SACCHARATE, AMPHETAMINE ASPARTATE, DEXTROAMPHETAMINE SULFATE AND AMPHETAMINE SULFATE 7.5; 7.5; 7.5; 7.5 MG/1; MG/1; MG/1; MG/1
30 TABLET ORAL EVERY MORNING
Qty: 30 TABLET | Refills: 0 | Status: SHIPPED | OUTPATIENT
Start: 2025-01-25 | End: 2025-02-24

## 2024-11-25 RX ORDER — DEXTROAMPHETAMINE SACCHARATE, AMPHETAMINE ASPARTATE, DEXTROAMPHETAMINE SULFATE AND AMPHETAMINE SULFATE 7.5; 7.5; 7.5; 7.5 MG/1; MG/1; MG/1; MG/1
30 TABLET ORAL EVERY MORNING
Qty: 30 TABLET | Refills: 0 | Status: SHIPPED | OUTPATIENT
Start: 2024-12-26 | End: 2025-01-25

## 2024-11-25 RX ORDER — BUPRENORPHINE HYDROCHLORIDE 8 MG/1
TABLET SUBLINGUAL
COMMUNITY
Start: 2024-11-06

## 2024-11-25 NOTE — ASSESSMENT & PLAN NOTE
Lab Results   Component Value Date    HGBA1C 7.2 (H) 08/28/2024       Urine micro 8/2024  Foot exam 8/2024  Eye exam in office 1/2024      On glipizide, statin, and trulicity 1.5mg weekly.   Has not had trulicity due to no insurance.  Will give samples of ozempic.  States her  is on.  She will apply for financial assistance program.  Advised to contact clinic for any issues. Continue glipizide. Watch diet/exercise     Monitor cbgs closely. Contact clinic for concerns.  Patient is agreeable to plan and verbalized understanding

## 2024-11-25 NOTE — ASSESSMENT & PLAN NOTE
"On  fenofibrate. Has been out. Likely cause of elevated lipid.  Refill sent in.     Lab Results   Component Value Date    CHOL 249 (H) 08/28/2024    CHOL 212 (H) 02/23/2023    CHOL 161 06/06/2018     Lab Results   Component Value Date    HDL 37 08/28/2024    HDL 40 02/23/2023    HDL 36 06/06/2018     No results found for: "LDLCALC"  Lab Results   Component Value Date    TRIG 674 (H) 08/28/2024    TRIG 178 (H) 02/23/2023    TRIG 238 (H) 06/06/2018       No results found for: "CHOLHDL"    "

## 2024-11-25 NOTE — ASSESSMENT & PLAN NOTE
on Adderall 30 mg in the 6 morning, adderall 20mg at noon Adderall 10 mg (1/2 of 20mg)  at 2pm    [employer] reviewed. Controlled substance policy signed 8/2024 refills sent in as requested. Reminded patient this is a controlled medication and must be seen q3 months while on.  Patient is agreeable to plan and verbalized understanding    UDS 8/2024 appropriately positive

## 2024-11-25 NOTE — TELEPHONE ENCOUNTER
----- Message from Dennis sent at 11/25/2024  1:45 PM CST -----  .Type:  Needs Medical Advice    Who Called: Katharine   Symptoms (please be specific):    How long has patient had these symptoms:    Pharmacy name and phone #:    Would the patient rather a call back or a response via [facility]?   Best Call Back Number: [phone]  Additional Information: Patient called she has a 2:00 apt she is on her way, I did advise her of 10 minute arrival time past the scheduled time. Thanks.

## 2024-11-25 NOTE — PROGRESS NOTES
Subjective:        Patient ID: Katharine Scott is a 49 y.o. female.    Chief Complaint: Follow-up (3 month f/u ADHD/Average  over the lat 4 weeks)      Patient presents to clinic unaccompanied for chronic condition follow up. She is due for her wellness visit in march.  She does not currently have insurance.    She has ADHD and is on Adderall 30 mg at 6am morning, adderall 20mg at noon and Adderall 10 mg (1/2 of adderall 20mg) at 2pm. needs refills of this. At new job. Does not have insurance with them since is part time.    UDS was appropriatley positive 8/2024. Also showed opiates- was on pain med from [facility] for knee pain.   Having to walk more and up and down stairs at new job. No trauma or injury. Having some swelling/puffiness.      The patient also has diabetes and thyroid disease(hypothyroidism) which was previously followed by Dr. Rodriguez. has not seen him since lost insurance. was on radioactive iodine. on synthroid 175mcg daily. tsh 8/2024 wnl.        She has diabetes.  when she is on trulicity- cbgs 127 average. she is prescribed trulicity 1.5mg weekly and glipizide 10mg (20mg BID).    Cbgs 227 30 day average because she has been out of trulicity since she does not currently have insurance.  Her  is on ozempic. Thinks she will qualify for patient assistance. Would like samples.   Following dmii diet. In office eye exam 1/2024.  a1c 7.2 8/2024 Foot exam 8/2024. Urine micro 8/2024     She has hyperlipidemia and is compliant with her medications. On fenofibrate. Ran out.  Lipids 8/2024 worse than previous.  Is back on.      She has bipolar/depression and saw psychiatry, Dr. Holly Camarena in the past. on wellbutrin and oxcarbazepine and effexor in past.  Currently on trileptal and trazodone.  Took lexapro- made wired and could not sleep.  Stopped.       Has seasonal allergies and is on dymista.      Has gerd. Doing well on ppi.     She is obese.      Mammogram 3/2023 was abnormal.  Dx mmg  "and us 4/2023 recommended biopsy. 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  Ordered.  Has not had colon cancer screening. She would like to schedule colonoscopy.  Referral placed. Cycles regular.  Declines pap today. Will plan on at next appt.      She is ALLERGIC to aspirin and metformin. She does not drink alcohol or smoke. declines tdap and Shingrix. completed covid series and booster.           Review of Systems   Constitutional: Negative.    HENT: Negative.     Respiratory: Negative.     Cardiovascular: Negative.    Gastrointestinal: Negative.    Genitourinary: Negative.          Review of patient's allergies indicates:   Allergen Reactions    Aspirin     Metformin      Other reaction(s): makes ill    Morphine     Penicillin Rash      Vitals:    11/25/24 1413   BP: 118/78   BP Location: Left arm   Patient Position: Sitting   Pulse: 84   Resp: 18   Temp: 97.9 °F (36.6 °C)   TempSrc: Temporal   SpO2: 97%   Weight: 102.7 kg (226 lb 6.4 oz)   Height: 5' 5" (1.651 m)      Social History     Socioeconomic History    Marital status:    Occupational History    Occupation: pharmacy   Tobacco Use    Smoking status: Never    Smokeless tobacco: Never   Substance and Sexual Activity    Alcohol use: Not Currently    Drug use: Never    Sexual activity: Yes     Partners: Male     Birth control/protection: None     Social Drivers of Health     Financial Resource Strain: Medium Risk (3/5/2024)    Overall Financial Resource Strain (CARDIA)     Difficulty of Paying Living Expenses: Somewhat hard   Food Insecurity: Food Insecurity Present (3/5/2024)    Hunger Vital Sign     Worried About Running Out of Food in the Last Year: Sometimes true     Ran Out of Food in the Last Year: Sometimes true   Transportation Needs: No Transportation Needs (3/5/2024)    PRAPARE - Transportation     Lack of " Transportation (Medical): No     Lack of Transportation (Non-Medical): No   Physical Activity: Insufficiently Active (3/5/2024)    Exercise Vital Sign     Days of Exercise per Week: 3 days     Minutes of Exercise per Session: 10 min   Stress: No Stress Concern Present (3/5/2024)    [place] [employer] - Occupational Stress Questionnaire     Feeling of Stress : Only a little   Housing Stability: Unknown (3/5/2024)    Housing Stability Vital Sign     Unable to Pay for Housing in the Last Year: Patient declined     Number of Places Lived in the Last Year: 1     Unstable Housing in the Last Year: No      No family history on file.       Objective:     Physical Exam  Vitals and nursing note reviewed.   Constitutional:       Appearance: Normal appearance. She is obese.   HENT:      Head: Normocephalic and atraumatic.      Nose: Nose normal.      Mouth/Throat:      Mouth: Mucous membranes are moist.      Pharynx: Oropharynx is clear.   Eyes:      Extraocular Movements: Extraocular movements intact.   Cardiovascular:      Rate and Rhythm: Normal rate and regular rhythm.      Pulses: Normal pulses.      Heart sounds: Normal heart sounds.   Pulmonary:      Effort: Pulmonary effort is normal.      Breath sounds: Normal breath sounds.   Musculoskeletal:         General: Normal range of motion.      Cervical back: Normal range of motion.   Skin:     General: Skin is warm and dry.   Neurological:      General: No focal deficit present.      Mental Status: She is alert and oriented to person, place, and time. Mental status is at baseline.   Psychiatric:         Mood and Affect: Mood normal.       Current Outpatient Medications on File Prior to Visit   Medication Sig Dispense Refill    azelastine-fluticasone (DYMISTA) 137-50 mcg/spray Spry nassal spray 1 spray by Each Nostril route 2 (two) times daily. 23 g 11    buprenorphine HCL (SUBUTEX) 8 mg Subl SMARTSIG:3 Tablet(s) Sublingual Every Morning       fenofibrate (TRICOR) 48 MG tablet Take 1 tablet (48 mg total) by mouth once daily. For cholesterol 90 tablet 3    glipiZIDE (GLUCOTROL) 10 MG tablet Take 2 tablets (20 mg total) by mouth 2 (two) times daily. 360 tablet 3    levothyroxine (SYNTHROID, LEVOTHROID) 175 MCG tablet Take 1 tablet (175 mcg total) by mouth once daily. BRAND NAME MEDICALLY NECESSARY 90 tablet 3    ondansetron (ZOFRAN) 4 MG tablet TAKE 1 TABLET (4 MG TOTAL) BY MOUTH 2 (TWO) TIMES DAILY. AS NEEDED FOR NAUSEA 15 tablet 1    OXcarbazepine (TRILEPTAL) 600 MG Tab Take 1 tablet (600 mg total) by mouth once daily. 90 tablet 3    pantoprazole (PROTONIX) 40 MG tablet Take 1 tablet (40 mg total) by mouth once daily. 90 tablet 3    polyethylene glycol (MIRALAX) 17 gram PwPk 0      traZODone (DESYREL) 100 MG tablet Take 2 tablets (200 mg total) by mouth every evening. 180 tablet 3    [DISCONTINUED] dextroamphetamine-amphetamine (ADDERALL) 20 mg tablet Take 1 tablet at 9 a.m. and 1/2 tablet at 2pm 45 tablet 0    [DISCONTINUED] dextroamphetamine-amphetamine (ADDERALL) 20 mg tablet Take 1 tablet at 9 a.m. and 1/2 tablet at 2pm 45 tablet 0    [DISCONTINUED] dextroamphetamine-amphetamine (ADDERALL) 20 mg tablet Take 1 tablet at 9 a.m. and 1/2 tablet at 2pm 45 tablet 0    [DISCONTINUED] dextroamphetamine-amphetamine 30 mg Tab Take 1 tablet (30 mg total) by mouth every morning. 30 tablet 0    [DISCONTINUED] dextroamphetamine-amphetamine 30 mg Tab Take 1 tablet (30 mg total) by mouth every morning. 30 tablet 0    [DISCONTINUED] dulaglutide (TRULICITY) 1.5 mg/0.5 mL pen injector Inject 1.5 mg into the skin every 7 days. (Patient not taking: Reported on 11/25/2024) 4 pen 11     No current facility-administered medications on file prior to visit.     Health Maintenance   Topic Date Due    Low Dose Statin  Never done    Colorectal Cancer Screening  Never done    Mammogram  09/19/2024    Eye Exam  01/31/2025    TETANUS VACCINE  05/29/2025 (Originally 3/3/1995)     Hemoglobin A1c  02/28/2025    Lipid Panel  08/28/2025    Foot Exam  08/29/2025    Hepatitis C Screening  Completed      Results for orders placed or performed in visit on 08/28/24   Comprehensive Metabolic Panel    Collection Time: 08/28/24  9:44 AM   Result Value Ref Range    Sodium 140 136 - 145 mmol/L    Potassium 4.9 3.5 - 5.1 mmol/L    Chloride 105 98 - 107 mmol/L    CO2 25 22 - 29 mmol/L    Glucose 231 (H) 74 - 100 mg/dL    Blood Urea Nitrogen 7.7 7.0 - 18.7 mg/dL    Creatinine 0.86 0.55 - 1.02 mg/dL    Calcium 9.8 8.4 - 10.2 mg/dL    Protein Total 7.5 6.4 - 8.3 gm/dL    Albumin 3.5 3.5 - 5.0 g/dL    Globulin 4.0 (H) 2.4 - 3.5 gm/dL    Albumin/Globulin Ratio 0.9 (L) 1.1 - 2.0 ratio    Bilirubin Total 0.2 <=1.5 mg/dL    ALP 82 40 - 150 unit/L    ALT 27 0 - 55 unit/L    AST 17 5 - 34 unit/L    eGFR >60 mL/min/1.73/m2    Anion Gap 10.0 mEq/L    BUN/Creatinine Ratio 9    Lipid Panel    Collection Time: 08/28/24  9:44 AM   Result Value Ref Range    Cholesterol Total 249 (H) <=200 mg/dL    HDL Cholesterol 37 35 - 60 mg/dL    Triglyceride 674 (H) 37 - 140 mg/dL    Cholesterol/HDL Ratio 7 (H) 0 - 5   TSH    Collection Time: 08/28/24  9:44 AM   Result Value Ref Range    TSH 1.699 0.350 - 4.940 uIU/mL   Hemoglobin A1C    Collection Time: 08/28/24  9:44 AM   Result Value Ref Range    Hemoglobin A1c 7.2 (H) <=7.0 %    Estimated Average Glucose 159.9 mg/dL   Microalbumin/Creatinine Ratio, Urine    Collection Time: 08/28/24  9:44 AM   Result Value Ref Range    Urine Microalbumin 18.6 <=30.0 ug/mL    Urine Creatinine 225.5 (H) 45.0 - 106.0 mg/dL    Microalbumin Creatinine Ratio 8.2 0.0 - 30.0 mg/gm Cr   Drug Screen, Urine    Collection Time: 08/28/24  9:44 AM   Result Value Ref Range    Amphetamines, Urine Positive (A) Negative    Barbiturates, Urine Negative Negative    Benzodiazepine, Urine Negative Negative    Cannabinoids, Urine Negative Negative    Cocaine, Urine Negative Negative    Fentanyl, Urine Negative Negative     MDMA, Urine Negative Negative    Opiates, Urine Positive (A) Negative    Phencyclidine, Urine Negative Negative    pH, Urine 6.0 3.0 - 11.0    Specific Gravity, Urine Auto >1.030 1.001 - 1.035   CBC with Differential    Collection Time: 08/28/24  9:44 AM   Result Value Ref Range    WBC 8.07 4.50 - 11.50 x10(3)/mcL    RBC 4.64 4.20 - 5.40 x10(6)/mcL    Hgb 13.4 12.0 - 16.0 g/dL    Hct 40.1 37.0 - 47.0 %    MCV 86.4 80.0 - 94.0 fL    MCH 28.9 27.0 - 31.0 pg    MCHC 33.4 33.0 - 36.0 g/dL    RDW 13.3 11.5 - 17.0 %    Platelet 305 130 - 400 x10(3)/mcL    MPV 9.8 7.4 - 10.4 fL    Neut % 65.0 %    Lymph % 25.3 %    Mono % 6.4 %    Eos % 2.5 %    Basophil % 0.6 %    Lymph # 2.04 0.6 - 4.6 x10(3)/mcL    Neut # 5.24 2.1 - 9.2 x10(3)/mcL    Mono # 0.52 0.1 - 1.3 x10(3)/mcL    Eos # 0.20 0 - 0.9 x10(3)/mcL    Baso # 0.05 <=0.2 x10(3)/mcL    IG# 0.02 0 - 0.04 x10(3)/mcL    IG% 0.2 %    NRBC% 0.0 %          Assessment & Plan:     Active Problem List with Overview Notes    Diagnosis Date Noted    Elevated blood pressure reading 03/05/2024    GERD (gastroesophageal reflux disease) 09/05/2023    Seasonal allergies 09/05/2023    Constipation, unspecified 05/22/2023    Breast cancer screening by mammogram 02/22/2023    Colon cancer screening 02/22/2023    Attention deficit hyperactivity disorder (ADHD) 08/02/2022    Bipolar disorder 08/02/2022    Diabetes mellitus 08/02/2022    Hyperlipidemia associated with type 2 diabetes mellitus 08/02/2022    Hypothyroidism 08/02/2022    Class 2 severe obesity due to excess calories with serious comorbidity and body mass index (BMI) of 37.0 to 37.9 in adult 08/02/2022    Tetanus, diphtheria, and acellular pertussis (Tdap) vaccination declined 08/02/2022       1. Attention deficit hyperactivity disorder (ADHD), unspecified ADHD type  Assessment & Plan:  on Adderall 30 mg in the 6 morning, adderall 20mg at noon Adderall 10 mg (1/2 of 20mg)  at 2pm    [facility] reviewed. Controlled substance policy  signed 8/2024 refills sent in as requested. Reminded patient this is a controlled medication and must be seen q3 months while on.  Patient is agreeable to plan and verbalized understanding    UDS 8/2024 appropriately positive    Orders:  -     dextroamphetamine-amphetamine 30 mg Tab; Take 1 tablet (30 mg total) by mouth every morning.  Dispense: 30 tablet; Refill: 0  -     dextroamphetamine-amphetamine 30 mg Tab; Take 1 tablet (30 mg total) by mouth every morning.  Dispense: 30 tablet; Refill: 0  -     dextroamphetamine-amphetamine 30 mg Tab; Take 1 tablet (30 mg total) by mouth every morning.  Dispense: 30 tablet; Refill: 0  -     dextroamphetamine-amphetamine (ADDERALL) 20 mg tablet; Take 1 tablet at 9 a.m. and 1/2 tablet at 2pm  Dispense: 45 tablet; Refill: 0  -     dextroamphetamine-amphetamine (ADDERALL) 20 mg tablet; Take 1 tablet at 9 a.m. and 1/2 tablet at 2pm  Dispense: 45 tablet; Refill: 0  -     dextroamphetamine-amphetamine (ADDERALL) 20 mg tablet; Take 1 tablet at 9 a.m. and 1/2 tablet at 2pm  Dispense: 45 tablet; Refill: 0    2. Bipolar affective disorder, remission status unspecified  Assessment & Plan:  Stable on trileptal and trazodone.  Will contact clinic for concerns. Patient is agreeable to plan and verbalized understanding      3. Type 2 diabetes mellitus with hyperglycemia, without long-term current use of insulin  Assessment & Plan:  Lab Results   Component Value Date    HGBA1C 7.2 (H) 08/28/2024       Urine micro 8/2024  Foot exam 8/2024  Eye exam in office 1/2024      On glipizide, statin, and trulicity 1.5mg weekly.   Has not had trulicity due to no insurance.  Will give samples of ozempic.  States her  is on.  She will apply for financial assistance program.  Advised to contact clinic for any issues. Continue glipizide. Watch diet/exercise     Monitor cbgs closely. Contact clinic for concerns.  Patient is agreeable to plan and verbalized understanding    Orders:  -     semaglutide  "(OZEMPIC) 0.25 mg or 0.5 mg (2 mg/3 mL) pen injector; Inject 0.25 mg into the skin every 7 days.  Dispense: 3 mL; Refill: 5    4. Hyperlipidemia associated with type 2 diabetes mellitus  Assessment & Plan:  On  fenofibrate. Has been out. Likely cause of elevated lipid.  Refill sent in.     Lab Results   Component Value Date    CHOL 249 (H) 08/28/2024    CHOL 212 (H) 02/23/2023    CHOL 161 06/06/2018     Lab Results   Component Value Date    HDL 37 08/28/2024    HDL 40 02/23/2023    HDL 36 06/06/2018     No results found for: "LDLCALC"  Lab Results   Component Value Date    TRIG 674 (H) 08/28/2024    TRIG 178 (H) 02/23/2023    TRIG 238 (H) 06/06/2018       No results found for: "CHOLHDL"        5. Acquired hypothyroidism  Assessment & Plan:  Lab Results   Component Value Date    TSH 1.699 08/28/2024       Stable on synthroid.        6. Class 2 severe obesity due to excess calories with serious comorbidity and body mass index (BMI) of 37.0 to 37.9 in adult  Assessment & Plan:  Encouraged lifestyle change           Follow up in about 3 months (around 2/25/2025).   "

## 2025-01-13 ENCOUNTER — TELEPHONE (OUTPATIENT)
Dept: FAMILY MEDICINE | Facility: CLINIC | Age: 50
End: 2025-01-13

## 2025-01-13 DIAGNOSIS — F90.9 ATTENTION DEFICIT HYPERACTIVITY DISORDER (ADHD), UNSPECIFIED ADHD TYPE: ICD-10-CM

## 2025-01-13 RX ORDER — DEXTROAMPHETAMINE SACCHARATE, AMPHETAMINE ASPARTATE, DEXTROAMPHETAMINE SULFATE AND AMPHETAMINE SULFATE 5; 5; 5; 5 MG/1; MG/1; MG/1; MG/1
TABLET ORAL
Qty: 45 TABLET | Refills: 0 | Status: SHIPPED | OUTPATIENT
Start: 2025-02-25

## 2025-01-13 RX ORDER — DEXTROAMPHETAMINE SACCHARATE, AMPHETAMINE ASPARTATE, DEXTROAMPHETAMINE SULFATE AND AMPHETAMINE SULFATE 7.5; 7.5; 7.5; 7.5 MG/1; MG/1; MG/1; MG/1
30 TABLET ORAL EVERY MORNING
Qty: 30 TABLET | Refills: 0 | Status: SHIPPED | OUTPATIENT
Start: 2025-02-25 | End: 2025-03-27

## 2025-01-13 NOTE — TELEPHONE ENCOUNTER
Home Lov 11/25/24  Scheduled 2/25/25 but cancelled and rescheduled for 3/5/25    [facility] reviewed.  Filled 12/26/24. I sent in refills for nov, dec and jan    Adderall is a controlled medication and must be seen q3 months while on.  She will need to be seen for refill to be approved after her January visit.

## 2025-01-13 NOTE — TELEPHONE ENCOUNTER
Spoke with andrew. Patient is rescheduled due to my being out of office. I will approve rx for feb.  Must attend march appt for further refills.       I have signed for the following orders AND/OR meds.  Please call the patient and ask the patient to schedule the testing AND/OR inform about any medications that were sent.        Medications Ordered This Encounter   Medications    dextroamphetamine-amphetamine (ADDERALL) 20 mg tablet     Sig: Take 1 tablet at 9 a.m. and 1/2 tablet at 2pm     Dispense:  45 tablet     Refill:  0    dextroamphetamine-amphetamine 30 mg Tab     Sig: Take 1 tablet (30 mg total) by mouth every morning.     Dispense:  30 tablet     Refill:  0

## 2025-01-13 NOTE — TELEPHONE ENCOUNTER
Patients appointment on 2.25.25 rescheduled. Patient requesting rx for adderall to be sent to pharmacy to be filled on 2.25.25 to last until appointment on 3.5.25. Please advise. Janay

## 2025-03-06 ENCOUNTER — OFFICE VISIT (OUTPATIENT)
Dept: FAMILY MEDICINE | Facility: CLINIC | Age: 50
End: 2025-03-06

## 2025-03-06 DIAGNOSIS — F90.9 ATTENTION DEFICIT HYPERACTIVITY DISORDER (ADHD), UNSPECIFIED ADHD TYPE: Primary | ICD-10-CM

## 2025-03-06 DIAGNOSIS — E11.65 TYPE 2 DIABETES MELLITUS WITH HYPERGLYCEMIA, WITHOUT LONG-TERM CURRENT USE OF INSULIN: ICD-10-CM

## 2025-03-06 RX ORDER — DEXTROAMPHETAMINE SACCHARATE, AMPHETAMINE ASPARTATE, DEXTROAMPHETAMINE SULFATE AND AMPHETAMINE SULFATE 5; 5; 5; 5 MG/1; MG/1; MG/1; MG/1
TABLET ORAL
Qty: 45 TABLET | Refills: 0 | Status: SHIPPED | OUTPATIENT
Start: 2025-04-24

## 2025-03-06 RX ORDER — DEXTROAMPHETAMINE SACCHARATE, AMPHETAMINE ASPARTATE, DEXTROAMPHETAMINE SULFATE AND AMPHETAMINE SULFATE 7.5; 7.5; 7.5; 7.5 MG/1; MG/1; MG/1; MG/1
30 TABLET ORAL EVERY MORNING
Qty: 30 TABLET | Refills: 0 | Status: SHIPPED | OUTPATIENT
Start: 2025-03-25 | End: 2025-04-24

## 2025-03-06 RX ORDER — DEXTROAMPHETAMINE SACCHARATE, AMPHETAMINE ASPARTATE, DEXTROAMPHETAMINE SULFATE AND AMPHETAMINE SULFATE 5; 5; 5; 5 MG/1; MG/1; MG/1; MG/1
TABLET ORAL
Qty: 45 TABLET | Refills: 0 | Status: SHIPPED | OUTPATIENT
Start: 2025-03-25

## 2025-03-06 RX ORDER — SEMAGLUTIDE 0.68 MG/ML
0.5 INJECTION, SOLUTION SUBCUTANEOUS
Start: 2025-03-06

## 2025-03-06 RX ORDER — DEXTROAMPHETAMINE SACCHARATE, AMPHETAMINE ASPARTATE, DEXTROAMPHETAMINE SULFATE AND AMPHETAMINE SULFATE 7.5; 7.5; 7.5; 7.5 MG/1; MG/1; MG/1; MG/1
30 TABLET ORAL EVERY MORNING
Qty: 30 TABLET | Refills: 0 | Status: SHIPPED | OUTPATIENT
Start: 2025-04-24 | End: 2025-05-24

## 2025-03-06 RX ORDER — DEXTROAMPHETAMINE SACCHARATE, AMPHETAMINE ASPARTATE, DEXTROAMPHETAMINE SULFATE AND AMPHETAMINE SULFATE 5; 5; 5; 5 MG/1; MG/1; MG/1; MG/1
TABLET ORAL
Qty: 45 TABLET | Refills: 0 | Status: SHIPPED | OUTPATIENT
Start: 2025-05-24

## 2025-03-06 RX ORDER — DEXTROAMPHETAMINE SACCHARATE, AMPHETAMINE ASPARTATE, DEXTROAMPHETAMINE SULFATE AND AMPHETAMINE SULFATE 7.5; 7.5; 7.5; 7.5 MG/1; MG/1; MG/1; MG/1
30 TABLET ORAL EVERY MORNING
Qty: 30 TABLET | Refills: 0 | Status: SHIPPED | OUTPATIENT
Start: 2025-05-04 | End: 2025-06-03

## 2025-03-06 NOTE — ASSESSMENT & PLAN NOTE
Lab Results   Component Value Date    HGBA1C 7.2 (H) 08/28/2024       Urine micro 8/2024  Foot exam 8/2024  Eye exam in office 1/2024      On glipizide, statin, and ozempic 0.5mg.  cbgs are still higher. Samples given. She will apply for financial assistance program.  Advised to contact clinic for any issues. Continue glipizide. Watch diet/exercise     Monitor cbgs closely. Contact clinic for concerns.  Patient is agreeable to plan and verbalized understanding

## 2025-03-06 NOTE — PROGRESS NOTES
Subjective:        Patient ID: Katharine Scott is a 50 y.o. female.    Chief Complaint: No chief complaint on file.      Patient presents to clinic via telemed unaccompanied for chronic condition follow up. She is due for her wellness visit in march.  She does not currently have insurance.    She has ADHD and is on Adderall 30 mg at 6am morning, adderall 20mg at noon and Adderall 10 mg (1/2 of adderall 20mg) at 2pm. needs refills of this. At new job. Does not have insurance with them since is part time.    UDS was appropriatley positive 8/2024. Also showed opiates- was on pain med from [facility] for knee pain.   Having to walk more and up and down stairs at new job. No trauma or injury. Having some swelling/puffiness.      The patient also has diabetes and thyroid disease(hypothyroidism) which was previously followed by Dr. Rodriguez. has not seen him since lost insurance. was on radioactive iodine. on synthroid 175mcg daily. tsh 8/2024 wnl.        She has diabetes.  when she is on trulicity- cbgs 127 average. she is prescribed glipizide and ozempic.    Her  is on ozempic. Thinks she will qualify for patient assistance.  Has not called yet but will do so soon. Would like samples.   Following dmii diet. In office eye exam 1/2024.  a1c 7.2 8/2024 Foot exam 8/2024. Urine micro 8/2024     She has hyperlipidemia and is compliant with her medications. On fenofibrate.       She has bipolar/depression and saw psychiatry, Dr. Holly Camarena in the past. on wellbutrin and oxcarbazepine and effexor in past.  Currently on trileptal and trazodone.  Took lexapro- made wired and could not sleep.  Stopped.       Has seasonal allergies and is on dymista.      Has gerd. Doing well on ppi.     She is obese.      Mammogram 3/2023 was abnormal.  Dx mmg and us 4/2023 recommended biopsy. 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  Ordered.  Has not had colon cancer screening. She would like to schedule colonoscopy.  Referral placed. Cycles regular.  Declines pap.      She is ALLERGIC to aspirin and metformin. She does not drink alcohol or smoke. declines tdap and Shingrix. completed covid series and booster.              The patient location is: work  The chief complaint leading to consultation is: chronic condition follow up    Visit type: audiovisual    Face to Face time with patient: 15 min  20 minutes of total time spent on the encounter, which includes face to face time and non-face to face time preparing to see the patient (eg, review of tests), Obtaining and/or reviewing separately obtained history, Documenting clinical information in the electronic or other health record, Independently interpreting results (not separately reported) and communicating results to the patient/family/caregiver, or Care coordination (not separately reported).         Each patient to whom he or she provides medical services by telemedicine is:  (1) informed of the relationship between the physician and patient and the respective role of any other health care provider with respect to management of the patient; and (2) notified that he or she may decline to receive medical services by telemedicine and may withdraw from such care at any time.    Notes:                Review of Systems   Constitutional:  Negative for activity change and unexpected weight change.   HENT:  Negative for hearing loss, rhinorrhea and trouble swallowing.    Eyes:  Negative for discharge and visual disturbance.   Respiratory:  Negative for chest tightness and wheezing.    Cardiovascular:  Negative for chest pain and palpitations.   Gastrointestinal:  Negative for blood in stool, constipation, diarrhea and vomiting.   Endocrine: Negative for polydipsia and polyuria.   Genitourinary:  Negative for difficulty urinating, dysuria, hematuria and menstrual  problem.   Musculoskeletal:  Negative for arthralgias, joint swelling and neck pain.   Neurological:  Negative for weakness and headaches.   Psychiatric/Behavioral:  Negative for confusion and dysphoric mood.          Review of patient's allergies indicates:   Allergen Reactions    Aspirin     Metformin      Other reaction(s): makes ill    Morphine     Penicillin Rash      There were no vitals filed for this visit.   Social History     Socioeconomic History    Marital status:    Occupational History    Occupation: pharmacy   Tobacco Use    Smoking status: Never    Smokeless tobacco: Never   Substance and Sexual Activity    Alcohol use: Not Currently    Drug use: Never    Sexual activity: Yes     Partners: Male     Birth control/protection: None     Social Drivers of Health     Financial Resource Strain: Medium Risk (3/6/2025)    Overall Financial Resource Strain (CARDIA)     Difficulty of Paying Living Expenses: Somewhat hard   Food Insecurity: Food Insecurity Present (3/6/2025)    Hunger Vital Sign     Worried About Running Out of Food in the Last Year: Sometimes true     Ran Out of Food in the Last Year: Sometimes true   Transportation Needs: No Transportation Needs (3/6/2025)    PRAPARE - Transportation     Lack of Transportation (Medical): No     Lack of Transportation (Non-Medical): No   Physical Activity: Unknown (3/6/2025)    Exercise Vital Sign     Days of Exercise per Week: 6 days   Stress: Patient Declined (3/6/2025)    [employer] - Occupational Stress Questionnaire     Feeling of Stress : Patient declined   Housing Stability: Unknown (3/6/2025)    Housing Stability Vital Sign     Unable to Pay for Housing in the Last Year: Patient declined     Homeless in the Last Year: No      No family history on file.       Objective:     Physical Exam  Vitals and nursing note reviewed.   Constitutional:       Appearance: Normal appearance. She is normal weight.   HENT:      Head:  Normocephalic and atraumatic.      Nose: Nose normal.   Pulmonary:      Effort: Pulmonary effort is normal.   Neurological:      General: No focal deficit present.      Mental Status: She is alert and oriented to person, place, and time. Mental status is at baseline.   Psychiatric:         Mood and Affect: Mood normal.       Medications Ordered Prior to Encounter[1]  Health Maintenance   Topic Date Due    Pneumococcal Vaccines (Age 0-49) (1 of 2 - PCV) Never done    Low Dose Statin  Never done    Colorectal Cancer Screening  Never done    Influenza Vaccine (1) 09/01/2024    COVID-19 Vaccine (7 - 2024-25 season) 09/01/2024    Mammogram  09/19/2024    Diabetic Eye Exam  01/31/2025    Hemoglobin A1c  02/28/2025    TETANUS VACCINE  05/29/2025 (Originally 3/3/1995)    Diabetes Urine Screening  08/28/2025    Lipid Panel  08/28/2025    Foot Exam  08/29/2025    RSV Vaccine (Age 60+ and Pregnant patients) (1 - 1-dose 75+ series) 03/03/2052    Hepatitis C Screening  Completed    HIV Screening  Completed    Cervical Cancer Screening  Discontinued      Results for orders placed or performed in visit on 08/28/24   Comprehensive Metabolic Panel    Collection Time: 08/28/24  9:44 AM   Result Value Ref Range    Sodium 140 136 - 145 mmol/L    Potassium 4.9 3.5 - 5.1 mmol/L    Chloride 105 98 - 107 mmol/L    CO2 25 22 - 29 mmol/L    Glucose 231 (H) 74 - 100 mg/dL    Blood Urea Nitrogen 7.7 7.0 - 18.7 mg/dL    Creatinine 0.86 0.55 - 1.02 mg/dL    Calcium 9.8 8.4 - 10.2 mg/dL    Protein Total 7.5 6.4 - 8.3 gm/dL    Albumin 3.5 3.5 - 5.0 g/dL    Globulin 4.0 (H) 2.4 - 3.5 gm/dL    Albumin/Globulin Ratio 0.9 (L) 1.1 - 2.0 ratio    Bilirubin Total 0.2 <=1.5 mg/dL    ALP 82 40 - 150 unit/L    ALT 27 0 - 55 unit/L    AST 17 5 - 34 unit/L    eGFR >60 mL/min/1.73/m2    Anion Gap 10.0 mEq/L    BUN/Creatinine Ratio 9    Lipid Panel    Collection Time: 08/28/24  9:44 AM   Result Value Ref Range    Cholesterol Total 249 (H) <=200 mg/dL    HDL  Cholesterol 37 35 - 60 mg/dL    Triglyceride 674 (H) 37 - 140 mg/dL    Cholesterol/HDL Ratio 7 (H) 0 - 5   TSH    Collection Time: 08/28/24  9:44 AM   Result Value Ref Range    TSH 1.699 0.350 - 4.940 uIU/mL   Hemoglobin A1C    Collection Time: 08/28/24  9:44 AM   Result Value Ref Range    Hemoglobin A1c 7.2 (H) <=7.0 %    Estimated Average Glucose 159.9 mg/dL   Microalbumin/Creatinine Ratio, Urine    Collection Time: 08/28/24  9:44 AM   Result Value Ref Range    Urine Microalbumin 18.6 <=30.0 ug/mL    Urine Creatinine 225.5 (H) 45.0 - 106.0 mg/dL    Microalbumin Creatinine Ratio 8.2 0.0 - 30.0 mg/gm Cr   Drug Screen, Urine    Collection Time: 08/28/24  9:44 AM   Result Value Ref Range    Amphetamines, Urine Positive (A) Negative    Barbiturates, Urine Negative Negative    Benzodiazepine, Urine Negative Negative    Cannabinoids, Urine Negative Negative    Cocaine, Urine Negative Negative    Fentanyl, Urine Negative Negative    MDMA, Urine Negative Negative    Opiates, Urine Positive (A) Negative    Phencyclidine, Urine Negative Negative    pH, Urine 6.0 3.0 - 11.0    Specific Gravity, Urine Auto >1.030 1.001 - 1.035   CBC with Differential    Collection Time: 08/28/24  9:44 AM   Result Value Ref Range    WBC 8.07 4.50 - 11.50 x10(3)/mcL    RBC 4.64 4.20 - 5.40 x10(6)/mcL    Hgb 13.4 12.0 - 16.0 g/dL    Hct 40.1 37.0 - 47.0 %    MCV 86.4 80.0 - 94.0 fL    MCH 28.9 27.0 - 31.0 pg    MCHC 33.4 33.0 - 36.0 g/dL    RDW 13.3 11.5 - 17.0 %    Platelet 305 130 - 400 x10(3)/mcL    MPV 9.8 7.4 - 10.4 fL    Neut % 65.0 %    Lymph % 25.3 %    Mono % 6.4 %    Eos % 2.5 %    Basophil % 0.6 %    Lymph # 2.04 0.6 - 4.6 x10(3)/mcL    Neut # 5.24 2.1 - 9.2 x10(3)/mcL    Mono # 0.52 0.1 - 1.3 x10(3)/mcL    Eos # 0.20 0 - 0.9 x10(3)/mcL    Baso # 0.05 <=0.2 x10(3)/mcL    Imm Gran # 0.02 0 - 0.04 x10(3)/mcL    Imm Grans % 0.2 %    NRBC% 0.0 %          Assessment & Plan:     Active Problem List with Overview Notes    Diagnosis Date Noted     Elevated blood pressure reading 03/05/2024    GERD (gastroesophageal reflux disease) 09/05/2023    Seasonal allergies 09/05/2023    Constipation, unspecified 05/22/2023    Breast cancer screening by mammogram 02/22/2023    Colon cancer screening 02/22/2023    Attention deficit hyperactivity disorder (ADHD) 08/02/2022    Bipolar disorder 08/02/2022    Diabetes mellitus 08/02/2022    Hyperlipidemia associated with type 2 diabetes mellitus 08/02/2022    Hypothyroidism 08/02/2022    Class 2 severe obesity due to excess calories with serious comorbidity and body mass index (BMI) of 37.0 to 37.9 in adult 08/02/2022    Tetanus, diphtheria, and acellular pertussis (Tdap) vaccination declined 08/02/2022       1. Attention deficit hyperactivity disorder (ADHD), unspecified ADHD type  Assessment & Plan:  on Adderall 30 mg in the 6 morning, adderall 20mg at noon Adderall 10 mg (1/2 of 20mg)  at 2pm    [facility] reviewed. Controlled substance policy signed 8/2024 refills sent in as requested. Reminded patient this is a controlled medication and must be seen q3 months while on.  Patient is agreeable to plan and verbalized understanding    UDS 8/2024 appropriately positive    Orders:  -     dextroamphetamine-amphetamine 30 mg Tab; Take 1 tablet (30 mg total) by mouth every morning.  Dispense: 30 tablet; Refill: 0  -     dextroamphetamine-amphetamine 30 mg Tab; Take 1 tablet (30 mg total) by mouth every morning.  Dispense: 30 tablet; Refill: 0  -     dextroamphetamine-amphetamine 30 mg Tab; Take 1 tablet (30 mg total) by mouth every morning.  Dispense: 30 tablet; Refill: 0  -     dextroamphetamine-amphetamine (ADDERALL) 20 mg tablet; Take 1 tablet at 9 a.m. and 1/2 tablet at 2pm  Dispense: 45 tablet; Refill: 0  -     dextroamphetamine-amphetamine (ADDERALL) 20 mg tablet; Take 1 tablet at 9 a.m. and 1/2 tablet at 2pm  Dispense: 45 tablet; Refill: 0  -     dextroamphetamine-amphetamine (ADDERALL) 20 mg tablet; Take 1 tablet at 9  a.m. and 1/2 tablet at 2pm  Dispense: 45 tablet; Refill: 0    2. Type 2 diabetes mellitus with hyperglycemia, without long-term current use of insulin  Assessment & Plan:  Lab Results   Component Value Date    HGBA1C 7.2 (H) 08/28/2024       Urine micro 8/2024  Foot exam 8/2024  Eye exam in office 1/2024      On glipizide, statin, and ozempic 0.5mg.  cbgs are still higher. Samples given. She will apply for financial assistance program.  Advised to contact clinic for any issues. Continue glipizide. Watch diet/exercise     Monitor cbgs closely. Contact clinic for concerns.  Patient is agreeable to plan and verbalized understanding    Orders:  -     semaglutide (OZEMPIC) 0.25 mg or 0.5 mg (2 mg/3 mL) pen injector; Inject 0.5 mg into the skin every 7 days.         Follow up in about 3 months (around 6/6/2025) for ADHD virtual visit.        [1]   Current Outpatient Medications on File Prior to Visit   Medication Sig Dispense Refill    azelastine-fluticasone (DYMISTA) 137-50 mcg/spray Spry nassal spray 1 spray by Each Nostril route 2 (two) times daily. 23 g 11    fenofibrate (TRICOR) 48 MG tablet Take 1 tablet (48 mg total) by mouth once daily. For cholesterol 90 tablet 3    glipiZIDE (GLUCOTROL) 10 MG tablet Take 2 tablets (20 mg total) by mouth 2 (two) times daily. 360 tablet 3    levothyroxine (SYNTHROID, LEVOTHROID) 175 MCG tablet Take 1 tablet (175 mcg total) by mouth once daily. BRAND NAME MEDICALLY NECESSARY 90 tablet 3    ondansetron (ZOFRAN) 4 MG tablet TAKE 1 TABLET (4 MG TOTAL) BY MOUTH 2 (TWO) TIMES DAILY. AS NEEDED FOR NAUSEA 15 tablet 1    OXcarbazepine (TRILEPTAL) 600 MG Tab Take 1 tablet (600 mg total) by mouth once daily. 90 tablet 3    pantoprazole (PROTONIX) 40 MG tablet Take 1 tablet (40 mg total) by mouth once daily. 90 tablet 3    traZODone (DESYREL) 100 MG tablet Take 2 tablets (200 mg total) by mouth every evening. 180 tablet 3    [DISCONTINUED] buprenorphine HCL (SUBUTEX) 8 mg Subl SMARTSIG:3  Tablet(s) Sublingual Every Morning      [DISCONTINUED] dextroamphetamine-amphetamine (ADDERALL) 20 mg tablet Take 1 tablet at 9 a.m. and 1/2 tablet at 2pm 45 tablet 0    [DISCONTINUED] dextroamphetamine-amphetamine (ADDERALL) 20 mg tablet Take 1 tablet at 9 a.m. and 1/2 tablet at 2pm 45 tablet 0    [DISCONTINUED] dextroamphetamine-amphetamine (ADDERALL) 20 mg tablet Take 1 tablet at 9 a.m. and 1/2 tablet at 2pm 45 tablet 0    [DISCONTINUED] dextroamphetamine-amphetamine 30 mg Tab Take 1 tablet (30 mg total) by mouth every morning. 30 tablet 0    [DISCONTINUED] polyethylene glycol (MIRALAX) 17 gram PwPk 0      [DISCONTINUED] semaglutide (OZEMPIC) 0.25 mg or 0.5 mg (2 mg/3 mL) pen injector Inject 0.25 mg into the skin every 7 days. 3 mL 5     No current facility-administered medications on file prior to visit.

## 2025-06-03 ENCOUNTER — OFFICE VISIT (OUTPATIENT)
Dept: FAMILY MEDICINE | Facility: CLINIC | Age: 50
End: 2025-06-03

## 2025-06-03 DIAGNOSIS — E11.65 TYPE 2 DIABETES MELLITUS WITH HYPERGLYCEMIA, WITHOUT LONG-TERM CURRENT USE OF INSULIN: ICD-10-CM

## 2025-06-03 DIAGNOSIS — F90.9 ATTENTION DEFICIT HYPERACTIVITY DISORDER (ADHD), UNSPECIFIED ADHD TYPE: Primary | ICD-10-CM

## 2025-06-03 DIAGNOSIS — E11.69 HYPERLIPIDEMIA ASSOCIATED WITH TYPE 2 DIABETES MELLITUS: ICD-10-CM

## 2025-06-03 DIAGNOSIS — E78.5 HYPERLIPIDEMIA ASSOCIATED WITH TYPE 2 DIABETES MELLITUS: ICD-10-CM

## 2025-06-03 DIAGNOSIS — E03.9 ACQUIRED HYPOTHYROIDISM: ICD-10-CM

## 2025-06-03 DIAGNOSIS — Z00.00 WELLNESS EXAMINATION: ICD-10-CM

## 2025-06-03 DIAGNOSIS — F31.9 BIPOLAR AFFECTIVE DISORDER, REMISSION STATUS UNSPECIFIED: ICD-10-CM

## 2025-06-03 RX ORDER — DEXTROAMPHETAMINE SACCHARATE, AMPHETAMINE ASPARTATE, DEXTROAMPHETAMINE SULFATE AND AMPHETAMINE SULFATE 5; 5; 5; 5 MG/1; MG/1; MG/1; MG/1
TABLET ORAL
Qty: 45 TABLET | Refills: 0 | Status: SHIPPED | OUTPATIENT
Start: 2025-08-22

## 2025-06-03 RX ORDER — SEMAGLUTIDE 1.34 MG/ML
1 INJECTION, SOLUTION SUBCUTANEOUS
Qty: 3 ML | Refills: 11 | Status: SHIPPED | OUTPATIENT
Start: 2025-06-03 | End: 2026-06-03

## 2025-06-03 RX ORDER — DEXTROAMPHETAMINE SACCHARATE, AMPHETAMINE ASPARTATE, DEXTROAMPHETAMINE SULFATE AND AMPHETAMINE SULFATE 7.5; 7.5; 7.5; 7.5 MG/1; MG/1; MG/1; MG/1
30 TABLET ORAL EVERY MORNING
Qty: 30 TABLET | Refills: 0 | Status: SHIPPED | OUTPATIENT
Start: 2025-08-22 | End: 2025-09-21

## 2025-06-03 RX ORDER — DEXTROAMPHETAMINE SACCHARATE, AMPHETAMINE ASPARTATE, DEXTROAMPHETAMINE SULFATE AND AMPHETAMINE SULFATE 7.5; 7.5; 7.5; 7.5 MG/1; MG/1; MG/1; MG/1
30 TABLET ORAL EVERY MORNING
Qty: 30 TABLET | Refills: 0 | Status: SHIPPED | OUTPATIENT
Start: 2025-07-23 | End: 2025-08-22

## 2025-06-03 RX ORDER — DEXTROAMPHETAMINE SACCHARATE, AMPHETAMINE ASPARTATE, DEXTROAMPHETAMINE SULFATE AND AMPHETAMINE SULFATE 5; 5; 5; 5 MG/1; MG/1; MG/1; MG/1
TABLET ORAL
Qty: 45 TABLET | Refills: 0 | Status: SHIPPED | OUTPATIENT
Start: 2025-06-23

## 2025-06-03 RX ORDER — DEXTROAMPHETAMINE SACCHARATE, AMPHETAMINE ASPARTATE, DEXTROAMPHETAMINE SULFATE AND AMPHETAMINE SULFATE 5; 5; 5; 5 MG/1; MG/1; MG/1; MG/1
TABLET ORAL
Qty: 45 TABLET | Refills: 0 | Status: SHIPPED | OUTPATIENT
Start: 2025-07-23

## 2025-06-03 RX ORDER — DEXTROAMPHETAMINE SACCHARATE, AMPHETAMINE ASPARTATE, DEXTROAMPHETAMINE SULFATE AND AMPHETAMINE SULFATE 7.5; 7.5; 7.5; 7.5 MG/1; MG/1; MG/1; MG/1
30 TABLET ORAL EVERY MORNING
Qty: 30 TABLET | Refills: 0 | Status: SHIPPED | OUTPATIENT
Start: 2025-06-23 | End: 2025-07-23

## 2025-07-02 ENCOUNTER — OFFICE VISIT (OUTPATIENT)
Dept: FAMILY MEDICINE | Facility: CLINIC | Age: 50
End: 2025-07-02

## 2025-07-02 VITALS
SYSTOLIC BLOOD PRESSURE: 142 MMHG | BODY MASS INDEX: 38.95 KG/M2 | WEIGHT: 233.81 LBS | TEMPERATURE: 98 F | HEART RATE: 98 BPM | OXYGEN SATURATION: 97 % | RESPIRATION RATE: 16 BRPM | DIASTOLIC BLOOD PRESSURE: 92 MMHG | HEIGHT: 65 IN

## 2025-07-02 DIAGNOSIS — R03.0 ELEVATED BLOOD PRESSURE READING: ICD-10-CM

## 2025-07-02 DIAGNOSIS — M79.2 NEURALGIA: Primary | ICD-10-CM

## 2025-07-02 DIAGNOSIS — E11.65 TYPE 2 DIABETES MELLITUS WITH HYPERGLYCEMIA, WITHOUT LONG-TERM CURRENT USE OF INSULIN: ICD-10-CM

## 2025-07-02 PROCEDURE — 99214 OFFICE O/P EST MOD 30 MIN: CPT | Mod: ,,, | Performed by: FAMILY MEDICINE

## 2025-07-02 RX ORDER — VALACYCLOVIR HYDROCHLORIDE 1 G/1
1000 TABLET, FILM COATED ORAL 3 TIMES DAILY
Qty: 21 TABLET | Refills: 0 | Status: SHIPPED | OUTPATIENT
Start: 2025-07-02 | End: 2025-07-09

## 2025-07-02 RX ORDER — GABAPENTIN 300 MG/1
300 CAPSULE ORAL 3 TIMES DAILY
Qty: 90 CAPSULE | Refills: 11 | Status: SHIPPED | OUTPATIENT
Start: 2025-07-02 | End: 2026-07-02

## 2025-07-02 RX ORDER — METHYLPREDNISOLONE 4 MG/1
TABLET ORAL
Qty: 21 EACH | Refills: 0 | Status: SHIPPED | OUTPATIENT
Start: 2025-07-02 | End: 2025-07-23

## 2025-07-02 NOTE — ASSESSMENT & PLAN NOTE
Lab Results   Component Value Date    HGBA1C 7.2 (H) 08/28/2024       Urine micro 8/2024  Foot exam 8/2024  Eye exam in office 1/2024      On glipizide, statin, and ozempic 1mg.  She will apply for financial assistance program.  Advised to contact clinic for any issues. Continue glipizide. Watch diet/exercise     Monitor cbgs closely. Contact clinic for concerns.  Patient is agreeable to plan and verbalized understanding

## 2025-07-02 NOTE — ASSESSMENT & PLAN NOTE
Initial reading elevated. Repeat improved but not to goal. No history of htn.  Is in pain.  Monitor bp at home. Contact clinic for concerns. Patient is agreeable to plan and verbalized understanding

## 2025-07-02 NOTE — ASSESSMENT & PLAN NOTE
Early shingles?  Will send in medrol dose pack, valtrex and gabapentin.  Take as directed. Cautioned gabapentin may cause drowsiness.  Try just taking qhs and see how she responds to med. Do not take before work or driving.  Also cautioned steroid may cause her sugar to elevate. Monitor closely while taking meds. Monitor symptoms. Contact clinic for concerns. Patient is agreeable to plan and verbalized understanding

## 2025-07-02 NOTE — PROGRESS NOTES
Subjective:        Patient ID: Katharine Scott is a 50 y.o. female.    Chief Complaint: Follow-up (Left upper thigh hurting, burning /Burning from sides of bilateral breasts, going towards back/Pins and needles feeling to fingers and above areas/Has had shingles in the past/Happening approx 4-5 days)      Patient presents to clinic unaccompanied for complaint. She is due for her wellness visit in march.  She does not currently have insurance. Started new job recently with benefits.     She reports sunburn like pain which started on her left lateral thigh about 4-5 days ago.  Very painful.  No rash. No itching.  Recently started with similar feeling to her bilateral outer breasts and then wraps around her back around her bra line.  Sensitive to touch.  No new meds.  No increased stress.  Has had shingles before but at her upper buttock/lower back area.  Cbgs have been good 120-130.  Bp is elevated. She is in pain.     She has diabetes.  when she is on trulicity- cbgs 127 average. she is prescribed glipizide and ozempic.    Her  is on ozempic. Thinks she will qualify for patient assistance. Cbgs 180-200.    Will give samples.  Her  will come to .  Following dmii diet. In office eye exam 1/2024.  a1c 7.2 8/2024 Foot exam 8/2024. Urine micro 8/2024          She has ADHD and is on Adderall 30 mg at 6am morning, adderall 20mg at noon and Adderall 10 mg (1/2 of adderall 20mg) at 2pm. needs refills of this. At new job. Does not have insurance with them since is part time.    UDS was appropriatley positive 8/2024. Also showed opiates- [place] [facility] [place].   Having to walk more and up and down stairs at new job. No trauma or injury. Having some swelling/puffiness.      The patient also has thyroid disease(hypothyroidism) which was previously followed by Dr. Rodriguez. has not seen him since lost insurance. was on radioactive iodine. on synthroid 175mcg daily. tsh 8/2024 wnl.        "  She has hyperlipidemia and is compliant with her medications. On fenofibrate.       She has bipolar/depression and saw psychiatry, Dr. Holly Camarena in the past. on wellbutrin and oxcarbazepine and effexor in past.  Currently on trileptal and trazodone.  Took lexapro- made wired and could not sleep.  Stopped.       Has seasonal allergies and is on dymista.      Has gerd. Doing well on ppi.     She is obese.      Mammogram 3/2023 was abnormal.  Dx mmg and us 4/2023 recommended biopsy. 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  Ordered.  Has not had colon cancer screening. She would like to schedule colonoscopy.  Referral placed. Cycles regular.  Declines pap.      She is ALLERGIC to aspirin and metformin. She does not drink alcohol or smoke. declines tdap and Shingrix. completed covid series and booster.         Review of Systems   Constitutional: Negative.    HENT: Negative.     Respiratory: Negative.     Cardiovascular: Negative.    Gastrointestinal: Negative.    Genitourinary: Negative.    Skin:  Negative for color change, pallor, rash and wound.   Neurological:  Negative for dizziness and seizures.         Review of patient's allergies indicates:   Allergen Reactions    Aspirin     Metformin      Other reaction(s): makes ill    Morphine     Penicillin Rash      Vitals:    07/02/25 1459 07/02/25 1538   BP: (!) 160/102 (!) 142/92   BP Location: Right arm    Pulse: 98    Resp: 16    Temp: 98 °F (36.7 °C)    TempSrc: Oral    SpO2: 97%    Weight: 106.1 kg (233 lb 12.8 oz)    Height: 5' 5" (1.651 m)       Social History     Socioeconomic History    Marital status:    Occupational History    Occupation: pharmacy   Tobacco Use    Smoking status: Never    Smokeless tobacco: Never   Substance and Sexual Activity    Alcohol use: Not Currently    Drug use: Never    Sexual " activity: Yes     Partners: Male     Birth control/protection: None     Social Drivers of Health     Financial Resource Strain: Medium Risk (3/6/2025)    Overall Financial Resource Strain (CARDIA)     Difficulty of Paying Living Expenses: Somewhat hard   Food Insecurity: Food Insecurity Present (3/6/2025)    Hunger Vital Sign     Worried About Running Out of Food in the Last Year: Sometimes true     Ran Out of Food in the Last Year: Sometimes true   Transportation Needs: No Transportation Needs (3/6/2025)    PRAPARE - Transportation     Lack of Transportation (Medical): No     Lack of Transportation (Non-Medical): No   Physical Activity: Unknown (3/6/2025)    Exercise Vital Sign     Days of Exercise per Week: 6 days   Stress: Patient Declined (3/6/2025)    Citizen of [place] [employer] - Occupational Stress Questionnaire     Feeling of Stress : Patient declined   Housing Stability: Unknown (3/6/2025)    Housing Stability Vital Sign     Unable to Pay for Housing in the Last Year: Patient declined     Homeless in the Last Year: No      No family history on file.       Objective:     Physical Exam  Vitals and nursing note reviewed.   Constitutional:       Appearance: Normal appearance. She is obese.   HENT:      Head: Normocephalic and atraumatic.      Nose: Nose normal.      Mouth/Throat:      Mouth: Mucous membranes are moist.      Pharynx: Oropharynx is clear.   Eyes:      Extraocular Movements: Extraocular movements intact.   Cardiovascular:      Rate and Rhythm: Normal rate and regular rhythm.      Pulses: Normal pulses.      Heart sounds: Normal heart sounds.   Pulmonary:      Effort: Pulmonary effort is normal.      Breath sounds: Normal breath sounds.   Musculoskeletal:         General: Normal range of motion.        Arms:       Cervical back: Normal range of motion.        Legs:       Comments: Area of sensitivity. No rash. No erythema. No increased warmth.    Skin:     General: Skin is warm and  dry.   Neurological:      General: No focal deficit present.      Mental Status: She is alert and oriented to person, place, and time. Mental status is at baseline.   Psychiatric:         Mood and Affect: Mood normal.       Medications Ordered Prior to Encounter[1]  Health Maintenance   Topic Date Due    Low Dose Statin  Never done    Colorectal Cancer Screening  Never done    COVID-19 Vaccine (7 - 2024-25 season) 09/01/2024    Mammogram  09/19/2024    Diabetic Eye Exam  01/31/2025    Hemoglobin A1c  02/28/2025    Foot Exam  08/29/2025    TETANUS VACCINE  07/02/2026 (Originally 3/3/1995)    Pneumococcal Vaccines (Age 0-49) (1 of 2 - PCV) 07/02/2026 (Originally 3/3/1996)    Diabetes Urine Screening  08/28/2025    Lipid Panel  08/28/2025    Influenza Vaccine (1) 09/01/2025    RSV Vaccine (Age 60+ and Pregnant patients) (1 - 1-dose 75+ series) 03/03/2052    Hepatitis C Screening  Completed    HIV Screening  Completed    Cervical Cancer Screening  Discontinued      Results for orders placed or performed in visit on 08/28/24   Comprehensive Metabolic Panel    Collection Time: 08/28/24  9:44 AM   Result Value Ref Range    Sodium 140 136 - 145 mmol/L    Potassium 4.9 3.5 - 5.1 mmol/L    Chloride 105 98 - 107 mmol/L    CO2 25 22 - 29 mmol/L    Glucose 231 (H) 74 - 100 mg/dL    Blood Urea Nitrogen 7.7 7.0 - 18.7 mg/dL    Creatinine 0.86 0.55 - 1.02 mg/dL    Calcium 9.8 8.4 - 10.2 mg/dL    Protein Total 7.5 6.4 - 8.3 gm/dL    Albumin 3.5 3.5 - 5.0 g/dL    Globulin 4.0 (H) 2.4 - 3.5 gm/dL    Albumin/Globulin Ratio 0.9 (L) 1.1 - 2.0 ratio    Bilirubin Total 0.2 <=1.5 mg/dL    ALP 82 40 - 150 unit/L    ALT 27 0 - 55 unit/L    AST 17 5 - 34 unit/L    eGFR >60 mL/min/1.73/m2    Anion Gap 10.0 mEq/L    BUN/Creatinine Ratio 9    Lipid Panel    Collection Time: 08/28/24  9:44 AM   Result Value Ref Range    Cholesterol Total 249 (H) <=200 mg/dL    HDL Cholesterol 37 35 - 60 mg/dL    Triglyceride 674 (H) 37 - 140 mg/dL     Cholesterol/HDL Ratio 7 (H) 0 - 5   TSH    Collection Time: 08/28/24  9:44 AM   Result Value Ref Range    TSH 1.699 0.350 - 4.940 uIU/mL   Hemoglobin A1C    Collection Time: 08/28/24  9:44 AM   Result Value Ref Range    Hemoglobin A1c 7.2 (H) <=7.0 %    Estimated Average Glucose 159.9 mg/dL   Microalbumin/Creatinine Ratio, Urine    Collection Time: 08/28/24  9:44 AM   Result Value Ref Range    Urine Microalbumin 18.6 <=30.0 ug/mL    Urine Creatinine 225.5 (H) 45.0 - 106.0 mg/dL    Microalbumin Creatinine Ratio 8.2 0.0 - 30.0 mg/gm Cr   Drug Screen, Urine    Collection Time: 08/28/24  9:44 AM   Result Value Ref Range    Amphetamines, Urine Positive (A) Negative    Barbiturates, Urine Negative Negative    Benzodiazepine, Urine Negative Negative    Cannabinoids, Urine Negative Negative    Cocaine, Urine Negative Negative    Fentanyl, Urine Negative Negative    MDMA, Urine Negative Negative    Opiates, Urine Positive (A) Negative    Phencyclidine, Urine Negative Negative    pH, Urine 6.0 3.0 - 11.0    Specific Gravity, Urine Auto >1.030 1.001 - 1.035   CBC with Differential    Collection Time: 08/28/24  9:44 AM   Result Value Ref Range    WBC 8.07 4.50 - 11.50 x10(3)/mcL    RBC 4.64 4.20 - 5.40 x10(6)/mcL    Hgb 13.4 12.0 - 16.0 g/dL    Hct 40.1 37.0 - 47.0 %    MCV 86.4 80.0 - 94.0 fL    MCH 28.9 27.0 - 31.0 pg    MCHC 33.4 33.0 - 36.0 g/dL    RDW 13.3 11.5 - 17.0 %    Platelet 305 130 - 400 x10(3)/mcL    MPV 9.8 7.4 - 10.4 fL    Neut % 65.0 %    Lymph % 25.3 %    Mono % 6.4 %    Eos % 2.5 %    Basophil % 0.6 %    Lymph # 2.04 0.6 - 4.6 x10(3)/mcL    Neut # 5.24 2.1 - 9.2 x10(3)/mcL    Mono # 0.52 0.1 - 1.3 x10(3)/mcL    Eos # 0.20 0 - 0.9 x10(3)/mcL    Baso # 0.05 <=0.2 x10(3)/mcL    Imm Gran # 0.02 0 - 0.04 x10(3)/mcL    Imm Grans % 0.2 %    NRBC% 0.0 %          Assessment & Plan:     Active Problem List with Overview Notes    Diagnosis Date Noted    Neuralgia 07/02/2025    Elevated blood pressure reading 03/05/2024     GERD (gastroesophageal reflux disease) 09/05/2023    Seasonal allergies 09/05/2023    Constipation, unspecified 05/22/2023    Breast cancer screening by mammogram 02/22/2023    Colon cancer screening 02/22/2023    Attention deficit hyperactivity disorder (ADHD) 08/02/2022    Bipolar disorder 08/02/2022    Diabetes mellitus 08/02/2022    Hyperlipidemia associated with type 2 diabetes mellitus 08/02/2022    Hypothyroidism 08/02/2022    Class 2 severe obesity due to excess calories with serious comorbidity and body mass index (BMI) of 37.0 to 37.9 in adult 08/02/2022    Tetanus, diphtheria, and acellular pertussis (Tdap) vaccination declined 08/02/2022       1. Neuralgia  Assessment & Plan:  Early shingles?  Will send in medrol dose pack, valtrex and gabapentin.  Take as directed. Cautioned gabapentin may cause drowsiness.  Try just taking qhs and see how she responds to med. Do not take before work or driving.  Also cautioned steroid may cause her sugar to elevate. Monitor closely while taking meds. Monitor symptoms. Contact clinic for concerns. Patient is agreeable to plan and verbalized understanding    Orders:  -     methylPREDNISolone (MEDROL DOSEPACK) 4 mg tablet; use as directed  Dispense: 21 each; Refill: 0  -     gabapentin (NEURONTIN) 300 MG capsule; Take 1 capsule (300 mg total) by mouth 3 (three) times daily.  Dispense: 90 capsule; Refill: 11  -     valACYclovir (VALTREX) 1000 MG tablet; Take 1 tablet (1,000 mg total) by mouth 3 (three) times daily. for 7 days  Dispense: 21 tablet; Refill: 0    2. Elevated blood pressure reading  Assessment & Plan:  Initial reading elevated. Repeat improved but not to goal. No history of htn.  Is in pain.  Monitor bp at home. Contact clinic for concerns. Patient is agreeable to plan and verbalized understanding      3. Type 2 diabetes mellitus with hyperglycemia, without long-term current use of insulin  Assessment & Plan:  Lab Results   Component Value Date    HGBA1C  7.2 (H) 08/28/2024       Urine micro 8/2024  Foot exam 8/2024  Eye exam in office 1/2024      On glipizide, statin, and ozempic 1mg.  She will apply for financial assistance program.  Advised to contact clinic for any issues. Continue glipizide. Watch diet/exercise     Monitor cbgs closely. Contact clinic for concerns.  Patient is agreeable to plan and verbalized understanding           Follow up for as scheduled or sooner prn.          [1]   Current Outpatient Medications on File Prior to Visit   Medication Sig Dispense Refill    azelastine-fluticasone (DYMISTA) 137-50 mcg/spray Spry nassal spray 1 spray by Each Nostril route 2 (two) times daily. 23 g 11    dextroamphetamine-amphetamine (ADDERALL) 20 mg tablet Take 1 tablet at 9 a.m. and 1/2 tablet at 2pm 45 tablet 0    [START ON 7/23/2025] dextroamphetamine-amphetamine (ADDERALL) 20 mg tablet Take 1 tablet at 9 a.m. and 1/2 tablet at 2pm 45 tablet 0    [START ON 8/22/2025] dextroamphetamine-amphetamine (ADDERALL) 20 mg tablet Take 1 tablet at 9 a.m. and 1/2 tablet at 2pm 45 tablet 0    dextroamphetamine-amphetamine 30 mg Tab Take 1 tablet (30 mg total) by mouth every morning. 30 tablet 0    [START ON 7/23/2025] dextroamphetamine-amphetamine 30 mg Tab Take 1 tablet (30 mg total) by mouth every morning. 30 tablet 0    [START ON 8/22/2025] dextroamphetamine-amphetamine 30 mg Tab Take 1 tablet (30 mg total) by mouth every morning. 30 tablet 0    fenofibrate (TRICOR) 48 MG tablet Take 1 tablet (48 mg total) by mouth once daily. For cholesterol 90 tablet 3    glipiZIDE (GLUCOTROL) 10 MG tablet Take 2 tablets (20 mg total) by mouth 2 (two) times daily. 360 tablet 3    levothyroxine (SYNTHROID, LEVOTHROID) 175 MCG tablet Take 1 tablet (175 mcg total) by mouth once daily. BRAND NAME MEDICALLY NECESSARY 90 tablet 3    ondansetron (ZOFRAN) 4 MG tablet TAKE 1 TABLET (4 MG TOTAL) BY MOUTH 2 (TWO) TIMES DAILY. AS NEEDED FOR NAUSEA 15 tablet 1    OXcarbazepine (TRILEPTAL) 600 MG  Tab Take 1 tablet (600 mg total) by mouth once daily. 90 tablet 3    pantoprazole (PROTONIX) 40 MG tablet Take 1 tablet (40 mg total) by mouth once daily. 90 tablet 3    semaglutide (OZEMPIC) 0.25 mg or 0.5 mg (2 mg/3 mL) pen injector Inject 0.5 mg into the skin every 7 days.      semaglutide (OZEMPIC) 1 mg/dose (4 mg/3 mL) Inject 1 mg into the skin every 7 days. 3 mL 11    traZODone (DESYREL) 100 MG tablet Take 2 tablets (200 mg total) by mouth every evening. 180 tablet 3     No current facility-administered medications on file prior to visit.

## 2025-07-21 DIAGNOSIS — F31.9 BIPOLAR AFFECTIVE DISORDER, REMISSION STATUS UNSPECIFIED: ICD-10-CM

## 2025-07-22 RX ORDER — TRAZODONE HYDROCHLORIDE 100 MG/1
200 TABLET ORAL NIGHTLY
Qty: 180 TABLET | Refills: 3 | Status: SHIPPED | OUTPATIENT
Start: 2025-07-22 | End: 2026-07-22

## 2025-08-12 DIAGNOSIS — E11.65 TYPE 2 DIABETES MELLITUS WITH HYPERGLYCEMIA, WITHOUT LONG-TERM CURRENT USE OF INSULIN: ICD-10-CM

## 2025-08-12 RX ORDER — GLIPIZIDE 10 MG/1
20 TABLET ORAL 2 TIMES DAILY
Qty: 360 TABLET | Refills: 3 | Status: SHIPPED | OUTPATIENT
Start: 2025-08-12 | End: 2026-08-12